# Patient Record
Sex: FEMALE | Race: BLACK OR AFRICAN AMERICAN | NOT HISPANIC OR LATINO | Employment: OTHER | URBAN - METROPOLITAN AREA
[De-identification: names, ages, dates, MRNs, and addresses within clinical notes are randomized per-mention and may not be internally consistent; named-entity substitution may affect disease eponyms.]

---

## 2017-01-20 ENCOUNTER — HOSPITAL ENCOUNTER (EMERGENCY)
Facility: HOSPITAL | Age: 62
Discharge: HOME | End: 2017-01-20
Attending: EMERGENCY MEDICINE
Payer: MEDICARE

## 2017-01-20 VITALS
DIASTOLIC BLOOD PRESSURE: 63 MMHG | BODY MASS INDEX: 25.34 KG/M2 | OXYGEN SATURATION: 98 % | RESPIRATION RATE: 16 BRPM | HEIGHT: 63 IN | SYSTOLIC BLOOD PRESSURE: 136 MMHG | WEIGHT: 143 LBS | TEMPERATURE: 98 F | HEART RATE: 71 BPM
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
## 2017-01-20 DIAGNOSIS — T07.XXXA ABRASIONS OF MULTIPLE SITES: Primary | ICD-10-CM

## 2017-01-20 DIAGNOSIS — S00.83XA FACIAL CONTUSION, INITIAL ENCOUNTER: ICD-10-CM

## 2017-01-20 DIAGNOSIS — S80.01XA CONTUSION OF RIGHT KNEE, INITIAL ENCOUNTER: ICD-10-CM

## 2017-01-20 LAB — GLUCOSE SERPL-MCNC: 131 MG/DL (ref 70–110)

## 2017-01-20 PROCEDURE — 25000003 PHARM REV CODE 250: Performed by: PHYSICIAN ASSISTANT

## 2017-01-20 PROCEDURE — 63600175 PHARM REV CODE 636 W HCPCS: Performed by: PHYSICIAN ASSISTANT

## 2017-01-20 PROCEDURE — 82962 GLUCOSE BLOOD TEST: CPT

## 2017-01-20 PROCEDURE — 99284 EMERGENCY DEPT VISIT MOD MDM: CPT | Mod: 25

## 2017-01-20 PROCEDURE — 96372 THER/PROPH/DIAG INJ SC/IM: CPT

## 2017-01-20 RX ORDER — MUPIROCIN 20 MG/G
OINTMENT TOPICAL DAILY
Status: DISCONTINUED | OUTPATIENT
Start: 2017-01-20 | End: 2017-01-20 | Stop reason: HOSPADM

## 2017-01-20 RX ORDER — KETOROLAC TROMETHAMINE 30 MG/ML
30 INJECTION, SOLUTION INTRAMUSCULAR; INTRAVENOUS
Status: COMPLETED | OUTPATIENT
Start: 2017-01-20 | End: 2017-01-20

## 2017-01-20 RX ORDER — OXYCODONE AND ACETAMINOPHEN 5; 325 MG/1; MG/1
1 TABLET ORAL
Status: COMPLETED | OUTPATIENT
Start: 2017-01-20 | End: 2017-01-20

## 2017-01-20 RX ADMIN — KETOROLAC TROMETHAMINE 30 MG: 30 INJECTION, SOLUTION INTRAMUSCULAR at 02:01

## 2017-01-20 RX ADMIN — MUPIROCIN: 20 OINTMENT TOPICAL at 01:01

## 2017-01-20 RX ADMIN — OXYCODONE AND ACETAMINOPHEN 1 TABLET: 5; 325 TABLET ORAL at 01:01

## 2017-01-20 NOTE — ED PROVIDER NOTES
Encounter Date: 2017       History     Chief Complaint   Patient presents with    [facility] to right nostril, denies LOC, complains of pain to head, right arm, right knee, right hand     Review of patient's allergies indicates:  No Known Allergies  HPI Comments: Lynn Bruce 61 y.o. Female with PMH of HTN, DM, HLD and chronic back pain presented to the ED with c/o pain following a ground level fall. She states that sh had a mistep causing her to fall to the ground with impact to the right side of the body. She denies any LOC however does report some facial impact that caused a right nose bleed that was controlled PTA> She c/o pain to the right upper extremities and right right knee that has gradually worsened since the fall. She presents with few abrasions of the affected areas. She states that the pain is exacerbated certain movements and palpation. She denies any decreased ROM or inability to ambulate however bearing weight causes some pain. She denies any vision changes, dizziness, nausea, vomiting, numbness, tingling, swelling, bruising, bowel or bladder incontinence. She denies trying any medications for the symptoms.       The history is provided by the patient.     Past Medical History   Diagnosis Date    Chronic back pain     Diabetes mellitus     Hyperlipidemia     Hypertension      No past medical history pertinent negatives.  Past Surgical History   Procedure Laterality Date    Fracture heel  3/27/2013     section, classic  3/27/2013     Family History   Problem Relation Age of Onset    Diabetes Mother     Hypertension Mother     Stroke Mother     Heart disease Father     Diabetes Sister     Stroke Sister     Diabetes Brother     Cancer Maternal Grandmother      Social History   Substance Use Topics    Smoking status: Current Some Day Smoker     Last attempt to quit: 10/31/2013    Smokeless tobacco: None    Alcohol use No     Review of  Systems   Constitutional: Positive for activity change. Negative for fever.   HENT: Positive for nosebleeds. Negative for facial swelling and sore throat.    Eyes: Negative for visual disturbance.   Respiratory: Negative for chest tightness and shortness of breath.    Cardiovascular: Negative for chest pain.   Gastrointestinal: Negative for abdominal pain, nausea and vomiting.   Genitourinary: Negative for dysuria.   Musculoskeletal: Negative for back pain and joint swelling.        Right arm, right hand and right knee pain   Skin: Positive for wound. Negative for rash.        abasions   Neurological: Positive for headaches. Negative for dizziness, weakness and light-headedness.   Hematological: Does not bruise/bleed easily.   Psychiatric/Behavioral: Negative for confusion.       Physical Exam   Initial Vitals   BP Pulse Resp Temp SpO2   01/20/17 1230 01/20/17 1230 01/20/17 1230 01/20/17 1230 01/20/17 1230   132/80 71 20 98 °F (36.7 °C) 96 %     Physical Exam    Nursing note and vitals reviewed.  Constitutional: Vital signs are normal. She appears well-developed and well-nourished. She is cooperative.  Non-toxic appearance. She does not appear ill. No distress.   HENT:   Head: Normocephalic and atraumatic.   Eyes: Conjunctivae, EOM and lids are normal. Pupils are equal, round, and reactive to light.   Neck: Normal range of motion. Neck supple. No rigidity.   Cardiovascular: Normal rate and regular rhythm.   Pulmonary/Chest: Breath sounds normal. No respiratory distress. She has no wheezes. She has no rhonchi.   Abdominal: Soft. Normal appearance and bowel sounds are normal. There is no tenderness. There is no rigidity and no guarding.   Musculoskeletal: Normal range of motion.        Right shoulder: She exhibits normal range of motion, no tenderness and no bony tenderness.        Right elbow: She exhibits normal range of motion and no swelling. No tenderness found.        Right wrist: She exhibits normal range of  motion, no tenderness and no bony tenderness.        Right knee: She exhibits normal range of motion, no swelling, no ecchymosis, no LCL laxity and no MCL laxity. Tenderness found.        Right hand: She exhibits normal range of motion, no tenderness and no bony tenderness.        Legs:  Neurological: She is alert and oriented to person, place, and time. She has normal strength. GCS eye subscore is 4. GCS verbal subscore is 5. GCS motor subscore is 6.   Skin: Skin is warm and dry. Abrasion noted. No bruising, no ecchymosis and no rash noted.   Psychiatric: She has a normal mood and affect. Her speech is normal and behavior is normal. Thought content normal.         ED Course   Procedures  Labs Reviewed - No data to display      Imaging Results         CT Maxillofacial Without Contrast (Final result) Result time:  01/20/17 14:14:56    Final result by Tessy Heck MD (01/20/17 14:14:56)    Impression:     No acute fracture.      Electronically signed by: TESSY HECK MD  Date:     01/20/17  Time:    14:14     Narrative:      EXAM: Maxillofacial CT without contrast    CLINICAL INDICATION:  A fall.    TECHNIQUE:  0.625  mm images were acquired through the paranasal sinuses from just above the frontal sinus down to just below the mandible without contrast were acquired.    COMPARISON:None.    FINDINGS: The frontal sinuses, frontal ethmoid recesses, ethmoid sinuses, sphenoid sinuses, and maxillary sinuses are clear.  Ostiomeatal complexes are patent bilaterally.  The nasal bones are intact and the nasal septum is midline.  The orbits and orbital contents have normal appearance.  There is soft tissue swelling overlying the maxilla.    There is no evidence of acute fracture.            CT Head Without Contrast (Final result) Result time:  01/20/17 14:12:17    Final result by Tessy Heck MD (01/20/17 14:12:17)    Impression:     Normal non-contrast cranial CT.      Electronically signed by: TESSY  MARIA R PETE  Date:     01/20/17  Time:    14:12     Narrative:      EXAM: Non-contrast head CT    CLINICAL INDICATION:  Fall.    TECHNIQUE: Routine cranial CT was acquired in the axial plane without contrast and coronal and sagittal reconstructions were performed.    COMPARISON:Prior from 3/5/15    FINDINGS: The brain parenchyma is of normal attenuation with no evidence of acute hemorrhage, mass lesion or major vascular distribution infarct.  There are no extra-axial masses or fluid collections.  The ventricular system is of normal size for age and midline.     The visualized paranasal sinuses and mastoid air cells are clear.     There is no evidence of acute fracture.            X-Ray Knee 3 View Right (Final result) Result time:  01/20/17 14:29:05    Final result by Brynn Hartley MD (01/20/17 14:29:05)    Impression:     No definite acute process seen.      Electronically signed by: BRYNN HARTLEY MD  Date:     01/20/17  Time:    14:29     Narrative:    Left knee radiograph    Results: 3 views.  No definite fracture identified.  No joint effusion.  There is minimal osteophyte formation.                                Attending Attestation:     Physician Attestation Statement for NP/PA:   I have conducted a face to face encounter with this patient in addition to the NP/PA, due to Medical Complexity    Other NP/PA Attestation Additions:      Medical Decision Making: The patient is here after falling onto her face.  She has no lacerations requiring repair.  She has pain to her right knee for x-rays of her near negative CT face and head are negative and the patient will be discharged.                 ED Course     Clinical Impression:   Diagnoses of Fall, Fall, and Fall were pertinent to this visit.  Multiple abrasions, right knee contusion        Ashlyn Peters MD  01/20/17 1800       Ashlyn Peters MD  02/06/17 0011       JUSTICE Henry  02/07/17 1100

## 2017-01-20 NOTE — DISCHARGE INSTRUCTIONS
Bruises (Contusions)    A contusion is a bruise. A bruise happens when a blow to your body doesn't break the skin but does break blood vessels beneath the skin. Blood leaking from the broken vessels causes redness and swelling. As it heals, your bruise is likely to turn colors like purple, green, and yellow. This is normal. The bruise should fade in 2 or 3 weeks.  Factors that make you more likely to bruise  Almost everyone bruises now and then. Certain people do bruise more easily than others. You're more prone to bruising as you get older. That's because blood vessels become more fragile with age. You're also more likely to bruise if you have a clotting disorder such as hemophilia or take medications that reduce clotting, including aspirin.  When to go to the emergency room (ER)  Bruises almost always heal on their own without special treatment. But for some people, a bad bruise can be serious. Seek medical care if you:  · Have a clotting disorder such as hemophilia.  · Have cirrhosis or other serious liver disease.  · Take blood-thinning medications such as warfarin (Coumadin).  What to expect in the ER  A doctor will examine your bruise and ask about any health conditions you have. In some cases, you may have a test to check how well your blood clots. Other treatment will depend on your needs.  Follow-up care  Sometimes a bruise gets worse instead of better. It may become larger and more swollen. This can occur when your body walls off a small pool of blood under the skin (hematoma). In very rare cases, your doctor may need to drain excess blood from the area.  Tip:  Apply an ice pack or bag of frozen peas to a bruise (keep a thin cloth between the cold source and your skin). This can help reduce redness and swelling.   © 8673-6464 [employer]. [address] [place] [address], [place] [address]. All rights reserved. This information is not intended as a substitute for professional medical care. Always  follow your healthcare professional's instructions.          Facial Contusion  A contusion is another word for a bruise. It happens when small blood vessels break open and leak blood into the nearby area. A facial contusion can result from a bump, hit, or fall. This may happen during sports or an accident. Symptoms of a contusion often include changes in skin color (bruising), swelling, and pain.   The swelling from the contusion should decrease in a few days. Bruising and pain may take several weeks to go away.   Home care  · If you have been prescribed medicines for pain, take them as directed.  · To help reduce swelling and pain, wrap a cold pack or bag of frozen peas in a thin towel. Put it on the injured area for up to 20 minutes. Do this a few times a day until the swelling goes down.   · If you have scrapes or cuts on your face requiring stiches or other closures, care for them as directed.  · For the next 24 hours (or longer if instructed):  ¨ Dont drink alcohol, or use sedatives or medicines that make you sleepy.  ¨ Dont drive or operate machinery.  ¨ Avoid doing anything strenuous. Dont lift or strain.  ¨ Do not return to sports or other activity that could result in another head injury.  Note about concussion  Because the injury was to your head, it is possible that a concussion (mild brain injury) could result. You don't have signs of a concussion at this time. But symptoms can show up later. Be alert for signs and symptoms of a concussion. Seek emergency medical care if any of these develop over the next hours to days:  · Headache  · Nausea or vomiting  · Dizziness  · Sensitivity to light or noise  · Unusual sleepiness or grogginess  · Trouble falling asleep  · Personality changes  · Vision changes  · Memory loss  · Confusion  · Trouble walking or clumsiness  · Loss of consciousness (even for a short time)  · Inability to be awakened   Follow-up care  Follow up with your healthcare provider or our  staff as directed.  When to seek medical advice  Call your healthcare provider right away if any of these occur:  · Swelling or pain that gets worse, not better  · New swelling or pain  · Warmth or drainage from the swollen area or from cuts or scrapes  · Fluid drainage or bleeding from the nose or ears  · Fever of 100.4ºF (38ºC) or higher, or as directed by your healthcare provider  © 8832-9377 [employer]. [address] [place] [address], [place] [address]. All rights reserved. This information is not intended as a substitute for professional medical care. Always follow your healthcare professional's instructions.

## 2017-01-20 NOTE — ED TRIAGE NOTES
Pt states walking walking across street to go to store and had started crossing street and R foot got stuck and she fell face first onto gorund.  Swelling with abrasion to R upper lip, hematoma to R forehead, pain with abrasion to R knee, and abrasion to posterior L hand. Denies syncope or loss of consciousness.

## 2017-01-20 NOTE — ED AVS SNAPSHOT
[facility] [address]  [place] [address]               Lynn Bruce   2017 12:57 PM   ED    Description:  Female : 1955   Department:  [facility]           Your Care was Coordinated By:     Provider Role From To    Ashlyn Peters MD Attending Provider 17 [id number] --    JUSTICE Henry Physician Assistant 17 [phone] --      Reason for Visit     Fall           Diagnoses this Visit        Comments    Abrasions of multiple sites    -  Primary     Fall         Facial contusion, initial encounter         Contusion of right knee, initial encounter           ED Disposition     None           To Do List           Follow-up Information     Follow up with Ekaterina Murray NP. Go in 1 week.    Specialty:  Family Medicine    Why:  For wound re-check    Contact information:    [address] [place] [address]  [phone]        [facility] On Call     [facility] -  Assistance  Registered nurses in the [facility] provide clinical advisement, health education, appointment booking, and other advisory services.  Call for this free service at [phone].             Medications           Message regarding Medications     Verify the changes and/or additions to your medication regime listed below are the same as discussed with your clinician today.  If any of these changes or additions are incorrect, please notify your healthcare provider.        These medications were administered today        Dose Freq    mupirocin 2 % ointment  Daily    Sig: Apply topically once daily.    Class: Normal    Route: Topical (Top)    Cosign for Ordering: Required by Ashlyn Peters MD    oxycodone-acetaminophen 5-325 mg per tablet 1 tablet 1 tablet ED 1 Time    Sig: Take 1 tablet by mouth ED 1 Time.    Class: Normal    Route: Oral    Cosign for Ordering: Required by Ashlyn Peters MD           Verify that the below list of  "medications is an accurate representation of the medications you are currently taking.  If none reported, the list may be blank. If incorrect, please contact your healthcare provider. Carry this list with you in case of emergency.           Current Medications     diazePAM (VALIUM) 10 MG Tab Take 10 mg by mouth 2 (two) times daily.    FREESTYLE LITE STRIPS Strp     lactulose (CEPHULAC) 20 gram Pack Take 1 packet (20 g total) by mouth once daily.    lisinopril (PRINIVIL,ZESTRIL) 2.5 MG tablet Take 2.5 mg by mouth once daily.    metformin (GLUCOPHAGE) 500 MG tablet Take 1,000 mg by mouth daily with breakfast.    mupirocin 2 % ointment Apply topically once daily.    oxycodone-acetaminophen (PERCOCET)  mg per tablet Take 1 tablet by mouth every 4 (four) hours as needed for Pain.    simethicone (GAS-X) 80 MG chewable tablet Take 1 tablet (80 mg total) by mouth every 6 (six) hours as needed for Flatulence.    simvastatin (ZOCOR) 40 MG tablet Take 80 mg by mouth every evening.            Clinical Reference Information           Your Vitals Were     BP Pulse Temp Resp Height Weight    132/80 71 98 °F (36.7 °C) 20 5' 3" (1.6 m) 64.9 kg (143 lb)    SpO2 BMI             96% 25.33 kg/m2         Allergies as of 1/20/2017     No Known Allergies      Immunizations Administered on Date of Encounter - 1/20/2017     None      ED Micro, Lab, POCT     Start Ordered       Status Ordering Provider    01/20/17 0000 01/20/17 1235  POCT glucose     Comments:  This order was created through External Result Entry    Completed       ED Imaging Orders     Start Ordered       Status Ordering Provider    01/20/17 1326 01/20/17 1328  CT Head Without Contrast  1 time imaging      Final result     01/20/17 1326 01/20/17 1328  X-Ray Knee 3 View Right  1 time imaging      Final result     01/20/17 1325 01/20/17 1328  CT Maxillofacial Without Contrast  1 time imaging      Final result         Discharge Instructions         Bruises " (Contusions)    A contusion is a bruise. A bruise happens when a blow to your body doesn't break the skin but does break blood vessels beneath the skin. Blood leaking from the broken vessels causes redness and swelling. As it heals, your bruise is likely to turn colors like purple, green, and yellow. This is normal. The bruise should fade in 2 or 3 weeks.  Factors that make you more likely to bruise  Almost everyone bruises now and then. Certain people do bruise more easily than others. You're more prone to bruising as you get older. That's because blood vessels become more fragile with age. You're also more likely to bruise if you have a clotting disorder such as hemophilia or take medications that reduce clotting, including aspirin.  When to go to the emergency room (ER)  Bruises almost always heal on their own without special treatment. But for some people, a bad bruise can be serious. Seek medical care if you:  · Have a clotting disorder such as hemophilia.  · Have cirrhosis or other serious liver disease.  · Take blood-thinning medications such as warfarin (Coumadin).  What to expect in the ER  A doctor will examine your bruise and ask about any health conditions you have. In some cases, you may have a test to check how well your blood clots. Other treatment will depend on your needs.  Follow-up care  Sometimes a bruise gets worse instead of better. It may become larger and more swollen. This can occur when your body walls off a small pool of blood under the skin (hematoma). In very rare cases, your doctor may need to drain excess blood from the area.  Tip:  Apply an ice pack or bag of frozen peas to a bruise (keep a thin cloth between the cold source and your skin). This can help reduce redness and swelling.   © 2619-6714 [employer]. [address] [place] [address], [place] [address]. All rights reserved. This information is not intended as a substitute for professional medical care. Always follow your  healthcare professional's instructions.          Facial Contusion  A contusion is another word for a bruise. It happens when small blood vessels break open and leak blood into the nearby area. A facial contusion can result from a bump, hit, or fall. This may happen during sports or an accident. Symptoms of a contusion often include changes in skin color (bruising), swelling, and pain.   The swelling from the contusion should decrease in a few days. Bruising and pain may take several weeks to go away.   Home care  · If you have been prescribed medicines for pain, take them as directed.  · To help reduce swelling and pain, wrap a cold pack or bag of frozen peas in a thin towel. Put it on the injured area for up to 20 minutes. Do this a few times a day until the swelling goes down.   · If you have scrapes or cuts on your face requiring stiches or other closures, care for them as directed.  · For the next 24 hours (or longer if instructed):  ¨ Dont drink alcohol, or use sedatives or medicines that make you sleepy.  ¨ Dont drive or operate machinery.  ¨ Avoid doing anything strenuous. Dont lift or strain.  ¨ Do not return to sports or other activity that could result in another head injury.  Note about concussion  Because the injury was to your head, it is possible that a concussion (mild brain injury) could result. You don't have signs of a concussion at this time. But symptoms can show up later. Be alert for signs and symptoms of a concussion. Seek emergency medical care if any of these develop over the next hours to days:  · Headache  · Nausea or vomiting  · Dizziness  · Sensitivity to light or noise  · Unusual sleepiness or grogginess  · Trouble falling asleep  · Personality changes  · Vision changes  · Memory loss  · Confusion  · Trouble walking or clumsiness  · Loss of consciousness (even for a short time)  · Inability to be awakened   Follow-up care  Follow up with your healthcare provider or our staff as  directed.  When to seek medical advice  Call your healthcare provider right away if any of these occur:  · Swelling or pain that gets worse, not better  · New swelling or pain  · Warmth or drainage from the swollen area or from cuts or scrapes  · Fluid drainage or bleeding from the nose or ears  · Fever of 100.4ºF (38ºC) or higher, or as directed by your healthcare provider  © 9211-2683 [employer]. [address] [place] [address], [place] [address]. All rights reserved. This information is not intended as a substitute for professional medical care. Always follow your healthcare professional's instructions.          [facility] Sign-Up     Activating your [facility] account is as easy as 1-2-3!     1) Visit [email or web address], select Sign Up Now, enter this activation code and your date of birth, then select Next.  [email or web address]  Expires: 3/6/2017  2:42 PM      2) Create a username and password to use when you visit [facility] in the future and select a security question in case you lose your password and select Next.    3) Enter your e-mail address and click Sign Up!    Additional Information  If you have questions, please e-mail [email or web address] or call [phone] to talk to our [facility] staff. Remember, [facility] is NOT to be used for urgent needs. For medical emergencies, dial 911.         Smoking Cessation     If you would like to quit smoking:   You may be eligible for free services if you are a [place] resident and started smoking cigarettes before September 1, 1988.  Call the Smoking Cessation Trust (SCT) toll free at (268) 297-2625 or (655) 730-3948.   Call 0-800-QUIT-NOW if you do not meet the above criteria.             [facility] complies with applicable Federal civil rights laws and does not discriminate on the basis of race, color, national origin, age, disability, or sex.        Language Assistance Services     ATTENTION: Language assistance services are available, free  of charge. Please call [phone].      ATENCIÓN: Si habla español, tiene a sandoval disposición servicios gratuitos de asistencia lingüística. Llame al [phone].     CHÚ Ý: N?u b?n nói Ti?ng Vi?t, có các d?ch v? h? tr? ngôn ng? mi?n phí dành cho b?n. G?i s? [phone].

## 2017-01-21 LAB — POCT GLUCOSE: 131 MG/DL (ref 70–110)

## 2017-09-01 DIAGNOSIS — M48.061 STENOSIS, SPINAL, LUMBAR: Primary | ICD-10-CM

## 2017-09-15 ENCOUNTER — HOSPITAL ENCOUNTER (OUTPATIENT)
Dept: RADIOLOGY | Facility: HOSPITAL | Age: 62
Discharge: HOME | End: 2017-09-15
Attending: PHYSICAL MEDICINE & REHABILITATION
Payer: MEDICARE

## 2017-09-15 DIAGNOSIS — M48.061 STENOSIS, SPINAL, LUMBAR: ICD-10-CM

## 2017-09-15 PROCEDURE — 72148 MRI LUMBAR SPINE W/O DYE: CPT | Mod: TC

## 2017-09-15 PROCEDURE — 72148 MRI LUMBAR SPINE W/O DYE: CPT | Mod: 26,,, | Performed by: RADIOLOGY

## 2018-05-09 ENCOUNTER — HOSPITAL ENCOUNTER (OUTPATIENT)
Facility: HOSPITAL | Age: 63
Discharge: HOME | End: 2018-05-09
Attending: EMERGENCY MEDICINE | Admitting: HOSPITALIST
Payer: MEDICARE

## 2018-05-09 VITALS
WEIGHT: 153.25 LBS | DIASTOLIC BLOOD PRESSURE: 65 MMHG | HEART RATE: 66 BPM | HEIGHT: 63 IN | SYSTOLIC BLOOD PRESSURE: 117 MMHG | BODY MASS INDEX: 27.15 KG/M2 | TEMPERATURE: 98 F | RESPIRATION RATE: 18 BRPM | OXYGEN SATURATION: 97 %

## 2018-05-09 DIAGNOSIS — R07.9 CHEST PAIN: Primary | ICD-10-CM

## 2018-05-09 DIAGNOSIS — K20.90 ESOPHAGITIS: ICD-10-CM

## 2018-05-09 PROBLEM — F17.200 CURRENT SMOKER: Status: ACTIVE | Noted: 2018-05-09

## 2018-05-09 PROBLEM — F41.9 ANXIETY: Status: ACTIVE | Noted: 2018-05-09

## 2018-05-09 PROBLEM — E78.2 MIXED HYPERLIPIDEMIA: Status: ACTIVE | Noted: 2018-05-09

## 2018-05-09 PROBLEM — E78.5 HYPERLIPIDEMIA: Status: ACTIVE | Noted: 2018-05-09

## 2018-05-09 LAB
ALBUMIN SERPL BCP-MCNC: 3.7 G/DL
ALP SERPL-CCNC: 81 U/L
ALT SERPL W/O P-5'-P-CCNC: 5 U/L
ANION GAP SERPL CALC-SCNC: 10 MMOL/L
AST SERPL-CCNC: 11 U/L
BASOPHILS # BLD AUTO: 0.02 K/UL
BASOPHILS NFR BLD: 0.3 %
BILIRUB SERPL-MCNC: 0.3 MG/DL
BNP SERPL-MCNC: 14 PG/ML
BUN SERPL-MCNC: 9 MG/DL
CALCIUM SERPL-MCNC: 9.5 MG/DL
CHLORIDE SERPL-SCNC: 104 MMOL/L
CHOLEST SERPL-MCNC: 156 MG/DL
CHOLEST/HDLC SERPL: 2.7 {RATIO}
CO2 SERPL-SCNC: 25 MMOL/L
CREAT SERPL-MCNC: 0.8 MG/DL
DIFFERENTIAL METHOD: ABNORMAL
EOSINOPHIL # BLD AUTO: 0.1 K/UL
EOSINOPHIL NFR BLD: 1.9 %
ERYTHROCYTE [DISTWIDTH] IN BLOOD BY AUTOMATED COUNT: 14 %
EST. GFR  (AFRICAN AMERICAN): >60 ML/MIN/1.73 M^2
EST. GFR  (NON AFRICAN AMERICAN): >60 ML/MIN/1.73 M^2
ESTIMATED AVG GLUCOSE: 131 MG/DL
GLUCOSE SERPL-MCNC: 128 MG/DL
HBA1C MFR BLD HPLC: 6.2 %
HCT VFR BLD AUTO: 34 %
HDLC SERPL-MCNC: 57 MG/DL
HDLC SERPL: 36.5 %
HGB BLD-MCNC: 11.2 G/DL
INR PPP: 1
LDLC SERPL CALC-MCNC: 81.8 MG/DL
LYMPHOCYTES # BLD AUTO: 2.6 K/UL
LYMPHOCYTES NFR BLD: 45.6 %
MAGNESIUM SERPL-MCNC: 1.7 MG/DL
MCH RBC QN AUTO: 28.8 PG
MCHC RBC AUTO-ENTMCNC: 32.9 G/DL
MCV RBC AUTO: 87 FL
MONOCYTES # BLD AUTO: 0.4 K/UL
MONOCYTES NFR BLD: 7.5 %
NEUTROPHILS # BLD AUTO: 2.6 K/UL
NEUTROPHILS NFR BLD: 44.7 %
NONHDLC SERPL-MCNC: 99 MG/DL
PLATELET # BLD AUTO: 236 K/UL
PMV BLD AUTO: 8.6 FL
POCT GLUCOSE: 104 MG/DL (ref 70–110)
POCT GLUCOSE: 94 MG/DL (ref 70–110)
POTASSIUM SERPL-SCNC: 3.9 MMOL/L
PROT SERPL-MCNC: 7.2 G/DL
PROTHROMBIN TIME: 10.4 SEC
RBC # BLD AUTO: 3.89 M/UL
SODIUM SERPL-SCNC: 139 MMOL/L
TRIGL SERPL-MCNC: 86 MG/DL
TROPONIN I SERPL DL<=0.01 NG/ML-MCNC: <0.006 NG/ML
TROPONIN I SERPL DL<=0.01 NG/ML-MCNC: <0.006 NG/ML
TSH SERPL DL<=0.005 MIU/L-ACNC: 1.85 UIU/ML
WBC # BLD AUTO: 5.72 K/UL

## 2018-05-09 PROCEDURE — 25000003 PHARM REV CODE 250: Performed by: NURSE PRACTITIONER

## 2018-05-09 PROCEDURE — G0378 HOSPITAL OBSERVATION PER HR: HCPCS

## 2018-05-09 PROCEDURE — 84484 ASSAY OF TROPONIN QUANT: CPT

## 2018-05-09 PROCEDURE — 85025 COMPLETE CBC W/AUTO DIFF WBC: CPT

## 2018-05-09 PROCEDURE — 99284 EMERGENCY DEPT VISIT MOD MDM: CPT | Mod: 25

## 2018-05-09 PROCEDURE — 93005 ELECTROCARDIOGRAM TRACING: CPT

## 2018-05-09 PROCEDURE — 93010 ELECTROCARDIOGRAM REPORT: CPT | Mod: ,,, | Performed by: INTERNAL MEDICINE

## 2018-05-09 PROCEDURE — 36415 COLL VENOUS BLD VENIPUNCTURE: CPT

## 2018-05-09 PROCEDURE — 83735 ASSAY OF MAGNESIUM: CPT

## 2018-05-09 PROCEDURE — 25000003 PHARM REV CODE 250: Performed by: EMERGENCY MEDICINE

## 2018-05-09 PROCEDURE — 83036 HEMOGLOBIN GLYCOSYLATED A1C: CPT

## 2018-05-09 PROCEDURE — 83880 ASSAY OF NATRIURETIC PEPTIDE: CPT

## 2018-05-09 PROCEDURE — 85610 PROTHROMBIN TIME: CPT

## 2018-05-09 PROCEDURE — 80053 COMPREHEN METABOLIC PANEL: CPT

## 2018-05-09 PROCEDURE — 80061 LIPID PANEL: CPT

## 2018-05-09 PROCEDURE — S4991 NICOTINE PATCH NONLEGEND: HCPCS | Performed by: NURSE PRACTITIONER

## 2018-05-09 PROCEDURE — 84443 ASSAY THYROID STIM HORMONE: CPT

## 2018-05-09 PROCEDURE — 84484 ASSAY OF TROPONIN QUANT: CPT | Mod: 91

## 2018-05-09 RX ORDER — IBUPROFEN 200 MG
1 TABLET ORAL DAILY
Status: DISCONTINUED | OUTPATIENT
Start: 2018-05-09 | End: 2018-05-09 | Stop reason: HOSPADM

## 2018-05-09 RX ORDER — DIAZEPAM 5 MG/1
10 TABLET ORAL 2 TIMES DAILY PRN
Status: DISCONTINUED | OUTPATIENT
Start: 2018-05-09 | End: 2018-05-09 | Stop reason: HOSPADM

## 2018-05-09 RX ORDER — SODIUM CHLORIDE 0.9 % (FLUSH) 0.9 %
3 SYRINGE (ML) INJECTION EVERY 8 HOURS
Status: DISCONTINUED | OUTPATIENT
Start: 2018-05-09 | End: 2018-05-09 | Stop reason: HOSPADM

## 2018-05-09 RX ORDER — OMEPRAZOLE 20 MG/1
20 CAPSULE, DELAYED RELEASE ORAL DAILY
Qty: 30 CAPSULE | Refills: 0 | Status: SHIPPED | OUTPATIENT
Start: 2018-05-09 | End: 2020-04-04 | Stop reason: SDUPTHER

## 2018-05-09 RX ORDER — GLUCAGON 1 MG
1 KIT INJECTION
Status: DISCONTINUED | OUTPATIENT
Start: 2018-05-09 | End: 2018-05-09 | Stop reason: HOSPADM

## 2018-05-09 RX ORDER — FAMOTIDINE 20 MG/1
20 TABLET, FILM COATED ORAL 2 TIMES DAILY
Status: DISCONTINUED | OUTPATIENT
Start: 2018-05-09 | End: 2018-05-09 | Stop reason: HOSPADM

## 2018-05-09 RX ORDER — INSULIN ASPART 100 [IU]/ML
0-5 INJECTION, SOLUTION INTRAVENOUS; SUBCUTANEOUS
Status: DISCONTINUED | OUTPATIENT
Start: 2018-05-09 | End: 2018-05-09 | Stop reason: HOSPADM

## 2018-05-09 RX ORDER — ACETAMINOPHEN 325 MG/1
650 TABLET ORAL EVERY 4 HOURS PRN
Status: DISCONTINUED | OUTPATIENT
Start: 2018-05-09 | End: 2018-05-09 | Stop reason: HOSPADM

## 2018-05-09 RX ORDER — LISINOPRIL 5 MG/1
5 TABLET ORAL DAILY
Status: DISCONTINUED | OUTPATIENT
Start: 2018-05-09 | End: 2018-05-09 | Stop reason: HOSPADM

## 2018-05-09 RX ORDER — ACETAMINOPHEN 325 MG/1
650 TABLET ORAL EVERY 4 HOURS PRN
Status: DISCONTINUED | OUTPATIENT
Start: 2018-05-09 | End: 2018-05-09

## 2018-05-09 RX ORDER — DIAZEPAM 5 MG/1
5 TABLET ORAL ONCE
Status: COMPLETED | OUTPATIENT
Start: 2018-05-09 | End: 2018-05-09

## 2018-05-09 RX ORDER — ACETAMINOPHEN 500 MG
1000 TABLET ORAL
Status: COMPLETED | OUTPATIENT
Start: 2018-05-09 | End: 2018-05-09

## 2018-05-09 RX ORDER — OXYCODONE AND ACETAMINOPHEN 10; 325 MG/1; MG/1
1 TABLET ORAL EVERY 8 HOURS PRN
COMMUNITY

## 2018-05-09 RX ORDER — ONDANSETRON 8 MG/1
8 TABLET, ORALLY DISINTEGRATING ORAL EVERY 8 HOURS PRN
Status: DISCONTINUED | OUTPATIENT
Start: 2018-05-09 | End: 2018-05-09 | Stop reason: HOSPADM

## 2018-05-09 RX ORDER — NAPROXEN SODIUM 220 MG/1
81 TABLET, FILM COATED ORAL DAILY
Status: DISCONTINUED | OUTPATIENT
Start: 2018-05-09 | End: 2018-05-09 | Stop reason: HOSPADM

## 2018-05-09 RX ORDER — IBUPROFEN 200 MG
24 TABLET ORAL
Status: DISCONTINUED | OUTPATIENT
Start: 2018-05-09 | End: 2018-05-09 | Stop reason: HOSPADM

## 2018-05-09 RX ORDER — IBUPROFEN 200 MG
16 TABLET ORAL
Status: DISCONTINUED | OUTPATIENT
Start: 2018-05-09 | End: 2018-05-09 | Stop reason: HOSPADM

## 2018-05-09 RX ORDER — NITROGLYCERIN 0.4 MG/1
0.4 TABLET SUBLINGUAL EVERY 5 MIN PRN
Status: DISCONTINUED | OUTPATIENT
Start: 2018-05-09 | End: 2018-05-09 | Stop reason: HOSPADM

## 2018-05-09 RX ORDER — SIMVASTATIN 20 MG/1
80 TABLET, FILM COATED ORAL NIGHTLY
Status: DISCONTINUED | OUTPATIENT
Start: 2018-05-09 | End: 2018-05-09 | Stop reason: HOSPADM

## 2018-05-09 RX ORDER — OXYCODONE AND ACETAMINOPHEN 7.5; 325 MG/1; MG/1
1 TABLET ORAL EVERY 8 HOURS PRN
Status: DISCONTINUED | OUTPATIENT
Start: 2018-05-09 | End: 2018-05-09

## 2018-05-09 RX ORDER — ASPIRIN 81 MG/1
81 TABLET ORAL DAILY
COMMUNITY

## 2018-05-09 RX ADMIN — ACETAMINOPHEN 1000 MG: 500 TABLET ORAL at 04:05

## 2018-05-09 RX ADMIN — LIDOCAINE HYDROCHLORIDE: 20 SOLUTION ORAL; TOPICAL at 06:05

## 2018-05-09 RX ADMIN — OXYCODONE HYDROCHLORIDE AND ACETAMINOPHEN 1 TABLET: 7.5; 325 TABLET ORAL at 08:05

## 2018-05-09 RX ADMIN — NICOTINE 1 PATCH: 21 PATCH, EXTENDED RELEASE TRANSDERMAL at 08:05

## 2018-05-09 RX ADMIN — DIAZEPAM 5 MG: 5 TABLET ORAL at 06:05

## 2018-05-09 RX ADMIN — ASPIRIN 81 MG 81 MG: 81 TABLET ORAL at 08:05

## 2018-05-09 RX ADMIN — LISINOPRIL 5 MG: 5 TABLET ORAL at 08:05

## 2018-05-09 RX ADMIN — FAMOTIDINE 20 MG: 20 TABLET ORAL at 08:05

## 2018-05-09 NOTE — ASSESSMENT & PLAN NOTE
Glucose on admit 128. Holding hoem Metformin 1000 mg daily.  Check blood glucose AC and HS and use SSI. Diabetic diet, when cleared for Diet. Check A1c.

## 2018-05-09 NOTE — PLAN OF CARE
Removed tele, no ectopy on tele. Removed IV, tip intact. Went over discharge instructions, verbalized understanding.

## 2018-05-09 NOTE — PLAN OF CARE
TN went to meet with patient, daughter at bedside.  Patient is discharged to home, no needs noted upon discharge. Follow-Up appointment scheduled.     --Follow-Up information reviewed with patient and daughter.    Patient's prescriptions called into the [facility].     Follow-up With  Details  Why  Contact Info   Ekaterina Murray NP  On 5/30/2018  at 11:00 am, Primary Care Physician Fax# [phone]  [address] [address]  [phone]      05/09/18 1147   Final Note   Assessment Type Final Discharge Note   Discharge Disposition Home   What phone number can be called within the next 1-3 days to see how you are doing after discharge? [phone]   Hospital Follow Up  Appt(s) scheduled? Yes   Discharge plans and expectations educations in teach back method with documentation complete? Yes   Right Care Referral Info   Post Acute Recommendation No Care     Fe Walters RN  Transition Navigator  [phone]

## 2018-05-09 NOTE — PLAN OF CARE
Problem: Patient Care Overview  Goal: Plan of Care Review  Outcome: Outcome(s) achieved Date Met: 05/09/18 05/09/18 1136   Coping/Psychosocial   Plan Of Care Reviewed With patient   Patient awake, alert and oriented. Complains Of a HA, gave a percocet and resolved. Patient's blood glucose monitored ac/hs and covered per sliding scale. Patients bed alarm remains on, call light in reach, non skid socks when out of bed.

## 2018-05-09 NOTE — HPI
Lynn Bruce is a 63 year old female with a PMHx of Type 2 Diabetes, Hypertension, Hyperlipidemia, Anxiety, GERD, and Chronic Back Pain.  She is a current smoker. Her PCP is Ekaterina Murray NP with [facility]. She presented to [facility] ED with with tight, mid sternal chest pain radiating to the neck and left chest onset just prior to arrival, waking her from sleep. She has associated with palpitations, fatigue, headache. She denies sweating, nausea, dizziness, light headedness, extremity numbness or weakness, abdominal pain. Her pain does not radiate to the back.  She took aspirin tonight with no improvement to her pain.  Her chest pain was mostly resolved in the ED after Aspirin 325 mg,  2 sublingual NTG, 1 inch NTG paste en route. She has occasional chest pain such as this, which usually resolves with an aspirin.  She states that she had a negative out-patient stress test and echocardiogram about 6 months ago at [facility]. Her Father  of a heart attack at the ago of 50.  Chest X-Ray showed no acute process, with some evidence of COPD.  Her first Troponin is negative. Her ECG is Normal Sinus Rhythm with no T wave changes. She is admitted observation status to [facility]. Upon my exam, she still endorses some mild epigastric discomfort and states she did not take her valium last night. Will give GI Cocktail and her home dose of Valium.

## 2018-05-09 NOTE — SUBJECTIVE & OBJECTIVE
Past Medical History:   Diagnosis Date    Chronic back pain     Diabetes mellitus     Hyperlipidemia     Hypertension        Past Surgical History:   Procedure Laterality Date     SECTION, CLASSIC  3/27/2013    fracture heel  3/27/2013       Review of patient's allergies indicates:  No Known Allergies    No current facility-administered medications on file prior to encounter.      Current Outpatient Prescriptions on File Prior to Encounter   Medication Sig    diazePAM (VALIUM) 10 MG Tab Take 10 mg by mouth 2 (two) times daily.    FREESTYLE LITE STRIPS Strp     lisinopril (PRINIVIL,ZESTRIL) 2.5 MG tablet Take 5 mg by mouth once daily.     metformin (GLUCOPHAGE) 500 MG tablet Take 1,000 mg by mouth daily with breakfast.    simvastatin (ZOCOR) 40 MG tablet Take 80 mg by mouth every evening.     [DISCONTINUED] lactulose (CEPHULAC) 20 gram Pack Take 1 packet (20 g total) by mouth once daily.    [DISCONTINUED] oxycodone-acetaminophen (PERCOCET)  mg per tablet Take 1 tablet by mouth every 4 (four) hours as needed for Pain.    [DISCONTINUED] simethicone (GAS-X) 80 MG chewable tablet Take 1 tablet (80 mg total) by mouth every 6 (six) hours as needed for Flatulence.     Family History     Problem Relation (Age of Onset)    Cancer Maternal Grandmother    Diabetes Mother, Sister, Brother    Heart disease Father    Hypertension Mother    Stroke Mother, Sister        Social History Main Topics    Smoking status: Current Some Day Smoker     Last attempt to quit: 10/31/2013    Smokeless tobacco: Not on file    Alcohol use No    Drug use: No    Sexual activity: Yes     Partners: Male     Review of Systems   Constitutional: Negative for chills, diaphoresis and fever.   HENT: Negative for sore throat and trouble swallowing.    Eyes: Negative for photophobia and visual disturbance.   Respiratory: Positive for chest tightness. Negative for cough, shortness of breath and wheezing.    Cardiovascular:  Positive for chest pain and palpitations.   Gastrointestinal: Negative for abdominal pain, constipation, diarrhea, nausea and vomiting.   Endocrine: Negative for polydipsia and polyphagia.   Genitourinary: Negative for decreased urine volume, dysuria, hematuria and urgency.   Musculoskeletal: Positive for back pain. Negative for joint swelling, neck pain and neck stiffness.   Neurological: Negative for weakness, numbness and headaches.   Psychiatric/Behavioral: Negative for agitation, dysphoric mood and suicidal ideas. The patient is nervous/anxious.      Objective:     Vital Signs (Most Recent):  Temp: 98.1 °F (36.7 °C) (05/09/18 0633)  Pulse: 70 (05/09/18 0633)  Resp: 16 (05/09/18 0633)  BP: 129/64 (05/09/18 0633)  SpO2: 97 % (05/09/18 0633) Vital Signs (24h Range):  Temp:  [98.1 °F (36.7 °C)-98.9 °F (37.2 °C)] 98.1 °F (36.7 °C)  Pulse:  [68-78] 70  Resp:  [15-18] 16  SpO2:  [96 %-98 %] 97 %  BP: (126-129)/(64-76) 129/64     Weight: 64.9 kg (143 lb)  Body mass index is 25.33 kg/m².    Physical Exam   Constitutional: She is oriented to person, place, and time. She appears well-developed and well-nourished. No distress.   HENT:   Head: Normocephalic and atraumatic.   Mouth/Throat: Oropharynx is clear and moist. No oropharyngeal exudate.   Eyes: Conjunctivae are normal. Pupils are equal, round, and reactive to light. No scleral icterus.   Neck: Neck supple.   Cardiovascular: Normal rate, regular rhythm, normal heart sounds and intact distal pulses.  Exam reveals no gallop and no friction rub.    No murmur heard.  Pulmonary/Chest: Effort normal and breath sounds normal. No respiratory distress. She has no wheezes. She has no rales.   Abdominal: Soft. Bowel sounds are normal. She exhibits no distension. There is no tenderness. There is no rebound and no guarding.   Musculoskeletal: She exhibits no edema, tenderness or deformity.   Neurological: She is alert and oriented to person, place, and time. She exhibits normal  muscle tone.   Skin: Skin is warm and dry. Capillary refill takes less than 2 seconds. No rash noted. She is not diaphoretic.   Psychiatric: Her behavior is normal. Her mood appears anxious.   Nursing note and vitals reviewed.        CRANIAL NERVES     CN III, IV, VI   Pupils are equal, round, and reactive to light.       Significant Labs:   CBC:   Recent Labs  Lab 05/09/18 0433   WBC 5.72   HGB 11.2*   HCT 34.0*        CMP:   Recent Labs  Lab 05/09/18 0433      K 3.9      CO2 25   *   BUN 9   CREATININE 0.8   CALCIUM 9.5   PROT 7.2   ALBUMIN 3.7   BILITOT 0.3   ALKPHOS 81   AST 11   ALT 5*   ANIONGAP 10   EGFRNONAA >60     Cardiac Markers:   Recent Labs  Lab 05/09/18  0532   BNP 14     Magnesium:   Recent Labs  Lab 05/09/18 0433   MG 1.7     Troponin:   Recent Labs  Lab 05/09/18 0433   TROPONINI <0.006     TSH: No results for input(s): TSH in the last 4320 hours.    ECG 5/9/18 428 am: Normal sinus rhythm (HR 71). Normal ECG, No STEMI  Significant Imaging: I have reviewed all pertinent imaging results/findings within the past 24 hours.   Imaging Results          X-Ray Chest PA And Lateral (Final result)  Result time 05/09/18 05:12:23    Final result by Isaiah Alegre MD (05/09/18 05:12:23)                 Impression:      1. No acute radiographic findings in the chest.  2. Slightly overexpanded lungs which can be seen with COPD.      Electronically signed by: Isaiah Alegre MD  Date:    05/09/2018  Time:    05:12             Narrative:    EXAMINATION:  XR CHEST PA AND LATERAL    CLINICAL HISTORY:  Chest Pain;    TECHNIQUE:  PA and lateral views of the chest were performed.    COMPARISON:  Radiograph 02/02/2015.    FINDINGS:  Mediastinal structures are midline.  Cardiac silhouette is normal in size.  Lungs are slightly over expanded but symmetric.  No focal airspace opacities.  No pneumothorax or pleural effusions.  No free air beneath the diaphragm.  No acute osseous  abnormalities.

## 2018-05-09 NOTE — ED TRIAGE NOTES
"Pt presents to the ED via EMS from home, with c/o substernal chest pain. Patient states pain woke her up out of her sleep. Describes as non "radiating pressure". Pt has smoking history and family history of cardiac dx but pt does not currently take any heart meds. After Aspirin 324 mg, Nitro 0.4 mg. SL x 2 + Nitropaste put into place, pt rates pain at 1 currently. Dr. Winchester at bedside. Vital signs are stable. No acute distress noted.   "

## 2018-05-09 NOTE — H&P
[facility]  History & Physical    Patient Name: Lynn Bruce  MRN: [id number]  Admission Date: 2018  Attending Physician: Heber Baker MD   Primary Care Provider: Ekaterina Murray NP         Patient information was obtained from patient, past medical records and ER records.     Subjective:     Principal Problem:Chest pain    Chief Complaint:   Chief Complaint   Patient presents with    Chest Pain     Patient presents to the ED with reports of having chest pain that woke her from sleeping. Called ems. EMS reports giving 324 mg of ASA, 2 sublingual ntg, 1 inch of nitro paste. Pain started at a 4 and is down to 1/10.         HPI: Lynn Bruce is a 63 year old female with a PMHx of Type 2 Diabetes, Hypertension, Hyperlipidemia, Anxiety, GERD, and Chronic Back Pain.  She is a current smoker. Her PCP is Ekaterina Murray NP with [facility]. She presented to [facility] ED with with tight, mid sternal chest pain radiating to the neck and left chest onset just prior to arrival, waking her from sleep. She has associated with palpitations, fatigue, headache. She denies sweating, nausea, dizziness, light headedness, extremity numbness or weakness, abdominal pain. Her pain does not radiate to the back.  She took aspirin tonight with no improvement to her pain.  Her chest pain was mostly resolved in the ED after Aspirin 325 mg,  2 sublingual NTG, 1 inch NTG paste en route. She has occasional chest pain such as this, which usually resolves with an aspirin.  She states that she had a negative out-patient stress test and echocardiogram about 6 months ago at [facility]. Her Father  of a heart attack at the ago of 50.  Chest X-Ray showed no acute process, with some evidence of COPD.  Her first Troponin is negative. Her ECG is Normal Sinus Rhythm with no T wave changes. She is admitted observation status to [facility]. Upon my exam, she still endorses some mild  epigastric discomfort and states she did not take her valium last night. Will give GI Cocktail and her home dose of Valium.     Past Medical History:   Diagnosis Date    Chronic back pain     Diabetes mellitus     Hyperlipidemia     Hypertension        Past Surgical History:   Procedure Laterality Date     SECTION, CLASSIC  3/27/2013    fracture heel  3/27/2013       Review of patient's allergies indicates:  No Known Allergies    No current facility-administered medications on file prior to encounter.      Current Outpatient Prescriptions on File Prior to Encounter   Medication Sig    diazePAM (VALIUM) 10 MG Tab Take 10 mg by mouth 2 (two) times daily.    FREESTYLE LITE STRIPS Strp     lisinopril (PRINIVIL,ZESTRIL) 2.5 MG tablet Take 5 mg by mouth once daily.     metformin (GLUCOPHAGE) 500 MG tablet Take 1,000 mg by mouth daily with breakfast.    simvastatin (ZOCOR) 40 MG tablet Take 80 mg by mouth every evening.     [DISCONTINUED] lactulose (CEPHULAC) 20 gram Pack Take 1 packet (20 g total) by mouth once daily.    [DISCONTINUED] oxycodone-acetaminophen (PERCOCET)  mg per tablet Take 1 tablet by mouth every 4 (four) hours as needed for Pain.    [DISCONTINUED] simethicone (GAS-X) 80 MG chewable tablet Take 1 tablet (80 mg total) by mouth every 6 (six) hours as needed for Flatulence.     Family History     Problem Relation (Age of Onset)    Cancer Maternal Grandmother    Diabetes Mother, Sister, Brother    Heart disease Father    Hypertension Mother    Stroke Mother, Sister        Social History Main Topics    Smoking status: Current Some Day Smoker     Last attempt to quit: 10/31/2013    Smokeless tobacco: Not on file    Alcohol use No    Drug use: No    Sexual activity: Yes     Partners: Male     Review of Systems   Constitutional: Negative for chills, diaphoresis and fever.   HENT: Negative for sore throat and trouble swallowing.    Eyes: Negative for photophobia and visual  disturbance.   Respiratory: Positive for chest tightness. Negative for cough, shortness of breath and wheezing.    Cardiovascular: Positive for chest pain and palpitations.   Gastrointestinal: Negative for abdominal pain, constipation, diarrhea, nausea and vomiting.   Endocrine: Negative for polydipsia and polyphagia.   Genitourinary: Negative for decreased urine volume, dysuria, hematuria and urgency.   Musculoskeletal: Positive for back pain. Negative for joint swelling, neck pain and neck stiffness.   Neurological: Negative for weakness, numbness and headaches.   Psychiatric/Behavioral: Negative for agitation, dysphoric mood and suicidal ideas. The patient is nervous/anxious.      Objective:     Vital Signs (Most Recent):  Temp: 98.1 °F (36.7 °C) (05/09/18 0633)  Pulse: 70 (05/09/18 0633)  Resp: 16 (05/09/18 0633)  BP: 129/64 (05/09/18 0633)  SpO2: 97 % (05/09/18 0633) Vital Signs (24h Range):  Temp:  [98.1 °F (36.7 °C)-98.9 °F (37.2 °C)] 98.1 °F (36.7 °C)  Pulse:  [68-78] 70  Resp:  [15-18] 16  SpO2:  [96 %-98 %] 97 %  BP: (126-129)/(64-76) 129/64     Weight: 64.9 kg (143 lb)  Body mass index is 25.33 kg/m².    Physical Exam   Constitutional: She is oriented to person, place, and time. She appears well-developed and well-nourished. No distress.   HENT:   Head: Normocephalic and atraumatic.   Mouth/Throat: Oropharynx is clear and moist. No oropharyngeal exudate.   Eyes: Conjunctivae are normal. Pupils are equal, round, and reactive to light. No scleral icterus.   Neck: Neck supple.   Cardiovascular: Normal rate, regular rhythm, normal heart sounds and intact distal pulses.  Exam reveals no gallop and no friction rub.    No murmur heard.  Pulmonary/Chest: Effort normal and breath sounds normal. No respiratory distress. She has no wheezes. She has no rales.   Abdominal: Soft. Bowel sounds are normal. She exhibits no distension. There is no tenderness. There is no rebound and no guarding.   Musculoskeletal: She  exhibits no edema, tenderness or deformity.   Neurological: She is alert and oriented to person, place, and time. She exhibits normal muscle tone.   Skin: Skin is warm and dry. Capillary refill takes less than 2 seconds. No rash noted. She is not diaphoretic.   Psychiatric: Her behavior is normal. Her mood appears anxious.   Nursing note and vitals reviewed.        CRANIAL NERVES     CN III, IV, VI   Pupils are equal, round, and reactive to light.       Significant Labs:   CBC:   Recent Labs  Lab 05/09/18  0433   WBC 5.72   HGB 11.2*   HCT 34.0*        CMP:   Recent Labs  Lab 05/09/18 0433      K 3.9      CO2 25   *   BUN 9   CREATININE 0.8   CALCIUM 9.5   PROT 7.2   ALBUMIN 3.7   BILITOT 0.3   ALKPHOS 81   AST 11   ALT 5*   ANIONGAP 10   EGFRNONAA >60     Cardiac Markers:   Recent Labs  Lab 05/09/18  0532   BNP 14     Magnesium:   Recent Labs  Lab 05/09/18 0433   MG 1.7     Troponin:   Recent Labs  Lab 05/09/18  0433   TROPONINI <0.006     TSH: No results for input(s): TSH in the last 4320 hours.    ECG 5/9/18 428 am: Normal sinus rhythm (HR 71). Normal ECG, No STEMI  Significant Imaging: I have reviewed all pertinent imaging results/findings within the past 24 hours.   Imaging Results          X-Ray Chest PA And Lateral (Final result)  Result time 05/09/18 05:12:23    Final result by Isaiah Alegre MD (05/09/18 05:12:23)                 Impression:      1. No acute radiographic findings in the chest.  2. Slightly overexpanded lungs which can be seen with COPD.      Electronically signed by: Isaiah Alegre MD  Date:    05/09/2018  Time:    05:12             Narrative:    EXAMINATION:  XR CHEST PA AND LATERAL    CLINICAL HISTORY:  Chest Pain;    TECHNIQUE:  PA and lateral views of the chest were performed.    COMPARISON:  Radiograph 02/02/2015.    FINDINGS:  Mediastinal structures are midline.  Cardiac silhouette is normal in size.  Lungs are slightly over expanded but symmetric.  No  focal airspace opacities.  No pneumothorax or pleural effusions.  No free air beneath the diaphragm.  No acute osseous abnormalities.                                  Assessment/Plan:     * Chest pain    63 year old female smoker with HTN, HLD, and strong family history of CAD in with mid-sternal chest pain radiating to the left chest, relieved by nitroglycerin, no with epigastric pain. She had a negative stress test 6 months ago, per her report.  She takes Aspirin 81 mg daily. She was given Aspirin 325 mg by EMS. First Troponin < 0.06. Normal ECG. This is likely her GERD and/or Anxiety.  --Trend Troponin  --Give GI Cocktail  --Continue home Aspirin 81 mg daily          Hyperlipidemia    Continue home simvastatin 80 mg daily. Check Lipid Level          Anxiety    Anxious on exam. Continue home diazepam 10 mg BID PRN          Current smoker    Smoking Cessation Counseling. Nicoderm Patch          HTN (hypertension)    Stable. Continue home lisinopril 5 mg daily          Type 2 diabetes mellitus    Glucose on admit 128. Holding hoem Metformin 1000 mg daily.  Check blood glucose AC and HS and use SSI. Diabetic diet, when cleared for Diet. Check A1c.                VTE Risk Mitigation     None             Courtney Caldera NP  Department of Hospital Medicine   [facility]

## 2018-05-09 NOTE — HOSPITAL COURSE
Ms. Bruce presented to [facility] for evaluation of substernal chest pain. She has tobacco abuse, DM2, HLD and HTN. She reports a negative stress test from PCP about 6 months ago. Her troponin was negative x 2. Her chest pain improved/resolved with GI cocktail. She likely has some esophagitis and possible esophageal spasm as the cause of her chest pain. Will have her take prilosec 20 mg daily instead of her Zantac for the next month to see if that will help. Discharge home today. Encouraged her and her daughter to quit smoking.

## 2018-05-09 NOTE — PLAN OF CARE
Received patient from ED, ambulated to the restroom and in bed with assist. Bed alarm on, call light in reach.

## 2018-05-09 NOTE — ED PROVIDER NOTES
Encounter Date: 2018    SCRIBE #1 NOTE: I, Dolores Sales, am scribing for, and in the presence of,  Dr. Juan David Winchester. I have scribed the entire note.       History     Chief Complaint   Patient presents with    Chest Pain     Patient presents to the ED with reports of having chest pain that woke her from sleeping. Called ems. EMS reports giving 324 mg of ASA, 2 sublingual ntg, 1 inch of nitro paste. Pain started at a 4 and is down to 1/10.      This is a 63 y.o. female who has a past medical history of Chronic back pain; Diabetes mellitus; Hyperlipidemia; and Hypertension.    The patient presents to the Emergency Department via EMS with tight, mid sternal chest pain radiating to the neck and left chest onset just prior to arrival, waking the pt from her sleep.   Symptoms are associated with palpitations, fatigue, headache.  Pt denies sweating, nausea, dizziness, light headedness, extremity numbness or weakness, abdominal pain. Her pain does not radiate to the back.  She states when she gets chest pain, it normally resolves after taking aspirin. She took aspirin tonight with no improvement to her pain.  Pt states her chest pain is mostly resolved in the ED after 2 sublingual NTG, 1 inch NTG paste en route.  Pt is a daily smoker.   She has positive family history of MI.  Pt has a past surgical history that includes fracture heel (3/27/2013) and  section, classic (3/27/2013).       The history is provided by the patient.     Review of patient's allergies indicates:  No Known Allergies  Past Medical History:   Diagnosis Date    Chronic back pain     Diabetes mellitus     Hyperlipidemia     Hypertension      Past Surgical History:   Procedure Laterality Date     SECTION, CLASSIC  3/27/2013    fracture heel  3/27/2013     Family History   Problem Relation Age of Onset    Diabetes Mother     Hypertension Mother     Stroke Mother     Heart disease Father     Diabetes Sister     Stroke Sister      Diabetes Brother     Cancer Maternal Grandmother      Social History   Substance Use Topics    Smoking status: Current Some Day Smoker     Last attempt to quit: 10/31/2013    Smokeless tobacco: Not on file    Alcohol use No     Review of Systems   Constitutional: Negative for activity change, fatigue and fever.   HENT: Negative for congestion and sore throat.    Respiratory: Negative for cough and shortness of breath.    Cardiovascular: Negative for chest pain.   Gastrointestinal: Negative for abdominal pain, diarrhea, nausea and vomiting.   Genitourinary: Negative for difficulty urinating and dysuria.   Musculoskeletal: Negative for back pain and myalgias.   Skin: Negative for rash and wound.   Neurological: Negative for dizziness, weakness and headaches.   Psychiatric/Behavioral: Negative for decreased concentration and dysphoric mood.       Physical Exam     Initial Vitals   BP Pulse Resp Temp SpO2   -- -- -- -- --      MAP       --         Physical Exam    Nursing note and vitals reviewed.  Constitutional: She appears well-developed and well-nourished. No distress.   HENT:   Head: Normocephalic and atraumatic.   Mouth/Throat: Oropharynx is clear and moist.   Eyes: Conjunctivae are normal. Pupils are equal, round, and reactive to light.   Neck: Normal range of motion. Neck supple.   Cardiovascular: Normal rate, regular rhythm and normal heart sounds.   No murmur heard.  Pulmonary/Chest: Breath sounds normal. No respiratory distress. She has no wheezes. She has no rhonchi. She has no rales.   Abdominal: Soft. Bowel sounds are normal. She exhibits no distension. There is no tenderness.   Musculoskeletal: Normal range of motion. She exhibits no edema or tenderness.   Lymphadenopathy:     She has no cervical adenopathy.   Neurological: She is alert and oriented to person, place, and time.   Skin: Skin is warm and dry. Capillary refill takes less than 2 seconds. No rash noted. No erythema.   Psychiatric: She  has a normal mood and affect. Thought content normal.         ED Course   Procedures  Labs Reviewed   CBC W/ AUTO DIFFERENTIAL - Abnormal; Notable for the following:        Result Value    RBC 3.89 (*)     Hemoglobin 11.2 (*)     Hematocrit 34.0 (*)     MPV 8.6 (*)     All other components within normal limits   COMPREHENSIVE METABOLIC PANEL - Abnormal; Notable for the following:     Glucose 128 (*)     ALT 5 (*)     All other components within normal limits   TROPONIN I   PROTIME-INR   B-TYPE NATRIURETIC PEPTIDE   TSH   MAGNESIUM   TSH   MAGNESIUM     EKG Readings: (Independently Interpreted)   Initial Reading: No STEMI.   EKG: Normal sinus rhythm at 71 bpm, nl axis, nl intervals, no hypertrophy, no ST-T changes as read by me (Dr. Winchester).      Imaging Results          X-Ray Chest PA And Lateral (Final result)  Result time 05/09/18 05:12:23    Final result by Isaiah Alegre MD (05/09/18 05:12:23)                 Impression:      1. No acute radiographic findings in the chest.  2. Slightly overexpanded lungs which can be seen with COPD.      Electronically signed by: Isaiah Alegre MD  Date:    05/09/2018  Time:    05:12             Narrative:    EXAMINATION:  XR CHEST PA AND LATERAL    CLINICAL HISTORY:  Chest Pain;    TECHNIQUE:  PA and lateral views of the chest were performed.    COMPARISON:  Radiograph 02/02/2015.    FINDINGS:  Mediastinal structures are midline.  Cardiac silhouette is normal in size.  Lungs are slightly over expanded but symmetric.  No focal airspace opacities.  No pneumothorax or pleural effusions.  No free air beneath the diaphragm.  No acute osseous abnormalities.                                    Medical Decision Making:   Initial Assessment:   This is an emergent evaluation of a 63 y.o. female patient with presentation of chest pain.   Differential Diagnosis:   ACS, GERD, MSK, pleurisy, less likely PTX or dissection  Clinical Tests:   Lab Tests: Ordered and Reviewed  Radiological  Study: Ordered and Reviewed  Medical Tests: Ordered and Reviewed  ED Management:  After my evaluation and workup, I believe this patient has significant chest pain, based upon history, physical exam and workup with risk stratification. Patient initial troponin was negative, EKG shows normal sinus rhythm at rate of 71 bpm. I do not believe the patient has a STEMI at this time.  Aspirin 325mg PO given prior to arrival.  At this time I believe the patient should be admitted for further evaluation and management.  Patient understands and agrees with plan.    Case discussed with SAMANTHA Caldera, hospitalist, who agrees and will place the patient under their service.                        Clinical Impression:     1. Chest pain    2. Esophagitis         Disposition:   Disposition: Admitted         Scribe attestation:  I, Dr. Juan David Winchester, personally performed the services described in this documentation.   All medical record entries made by the scribe were at my direction and in my presence.   I have reviewed the chart and agree that the record is accurate and complete.   Juan David Winchester MD.             Juan David Winchester MD  05/12/18 [id number]

## 2018-05-09 NOTE — DISCHARGE SUMMARY
[facility] Hospital Medicine  Discharge Summary      Patient Name: Lynn Bruce  MRN: [id number]  Admission Date: 2018  Hospital Length of Stay: 0 days  Discharge Date and Time: 2018 12:28 PM  Attending Physician: Heber Baker MD   Discharging Provider: Heber Baker MD  Primary Care Provider: Ekaterina Murray NP      HPI:   Lynn Bruce is a 63 year old female with a PMHx of Type 2 Diabetes, Hypertension, Hyperlipidemia, Anxiety, GERD, and Chronic Back Pain.  She is a current smoker. Her PCP is Ekaterina Murray NP with [facility]. She presented to [facility] ED with with tight, mid sternal chest pain radiating to the neck and left chest onset just prior to arrival, waking her from sleep. She has associated with palpitations, fatigue, headache. She denies sweating, nausea, dizziness, light headedness, extremity numbness or weakness, abdominal pain. Her pain does not radiate to the back.  She took aspirin tonight with no improvement to her pain.  Her chest pain was mostly resolved in the ED after Aspirin 325 mg,  2 sublingual NTG, 1 inch NTG paste en route. She has occasional chest pain such as this, which usually resolves with an aspirin.  She states that she had a negative out-patient stress test and echocardiogram about 6 months ago at [facility]. Her Father  of a heart attack at the ago of 50.  Chest X-Ray showed no acute process, with some evidence of COPD.  Her first Troponin is negative. Her ECG is Normal Sinus Rhythm with no T wave changes. She is admitted observation status to [facility]. Upon my exam, she still endorses some mild epigastric discomfort and states she did not take her valium last night. Will give GI Cocktail and her home dose of Valium.     * No surgery found *      Hospital Course:   Ms. Bruce presented to [facility] for evaluation of substernal chest pain. She has tobacco abuse, DM2, HLD and HTN. She reports a negative stress test  from PCP about 6 months ago. Her troponin was negative x 2. Her chest pain improved/resolved with GI cocktail. She likely has some esophagitis and possible esophageal spasm as the cause of her chest pain. Will have her take prilosec 20 mg daily instead of her Zantac for the next month to see if that will help. Discharge home today. Encouraged her and her daughter to quit smoking.      Consults:     No new Assessment & Plan notes have been filed under this hospital service since the last note was generated.  Service: Hospital Medicine    Final Active Diagnoses:    Diagnosis Date Noted POA    Current smoker [F17.200] 05/09/2018 Yes    Anxiety [F41.9] 05/09/2018 Yes    Mixed hyperlipidemia [E78.2] 05/09/2018 Yes    Type 2 diabetes mellitus without complication, without long-term current use of insulin [E11.9]  Yes    Essential hypertension [I10]  Yes      Problems Resolved During this Admission:    Diagnosis Date Noted Date Resolved POA    PRINCIPAL PROBLEM:  Chest pain [R07.9] 05/09/2018 05/09/2018 Yes       Discharged Condition: good    Disposition: Home or Self Care    Follow Up:  Follow-up Information     Ekaterina Murray NP On 5/30/2018.    Specialty:  Family Medicine  Why:  at 11:00 am, Primary Care Physician Fax# [phone]  Contact information:  [address] [place] [address]  [phone]                 Patient Instructions:     Diet diabetic     Activity as tolerated     Notify your health care provider if you experience any of the following:  temperature >100.4     Notify your health care provider if you experience any of the following:  persistent nausea and vomiting or diarrhea     Notify your health care provider if you experience any of the following:  difficulty breathing or increased cough     Notify your health care provider if you experience any of the following:  persistent dizziness, light-headedness, or visual disturbances         Significant Diagnostic Studies: Labs:   CMP   Recent  Labs  Lab 05/09/18  0433      K 3.9      CO2 25   *   BUN 9   CREATININE 0.8   CALCIUM 9.5   PROT 7.2   ALBUMIN 3.7   BILITOT 0.3   ALKPHOS 81   AST 11   ALT 5*   ANIONGAP 10   ESTGFRAFRICA >60   EGFRNONAA >60   , CBC   Recent Labs  Lab 05/09/18 0433   WBC 5.72   HGB 11.2*   HCT 34.0*      , Lipid Panel   Lab Results   Component Value Date    CHOL 156 05/09/2018    HDL 57 05/09/2018    LDLCALC 81.8 05/09/2018    TRIG 86 05/09/2018    CHOLHDL 36.5 05/09/2018   , Troponin   Recent Labs  Lab 05/09/18 0914   TROPONINI <0.006    and A1C:   Recent Labs  Lab 05/09/18 0914   HGBA1C 6.2*     Radiology: X-Ray: CXR: X-Ray Chest PA and Lateral (CXR):   Results for orders placed or performed during the hospital encounter of 05/09/18   X-Ray Chest PA And Lateral    Narrative    EXAMINATION:  XR CHEST PA AND LATERAL    CLINICAL HISTORY:  Chest Pain;    TECHNIQUE:  PA and lateral views of the chest were performed.    COMPARISON:  Radiograph 02/02/2015.    FINDINGS:  Mediastinal structures are midline.  Cardiac silhouette is normal in size.  Lungs are slightly over expanded but symmetric.  No focal airspace opacities.  No pneumothorax or pleural effusions.  No free air beneath the diaphragm.  No acute osseous abnormalities.      Impression    1. No acute radiographic findings in the chest.  2. Slightly overexpanded lungs which can be seen with COPD.      Electronically signed by: Isaiah Alegre MD  Date:    05/09/2018  Time:    05:12       Pending Diagnostic Studies:     None         Medications:  Reconciled Home Medications:      Medication List      START taking these medications    omeprazole 20 MG capsule  Commonly known as:  PRILOSEC  Take 1 capsule (20 mg total) by mouth once daily.        CONTINUE taking these medications    aspirin 81 MG EC tablet  Commonly known as:  ECOTRIN  Take 81 mg by mouth once daily.     diazePAM 10 MG Tab  Commonly known as:  VALIUM  Take 10 mg by mouth 2 (two) times  daily.     FREESTYLE LITE STRIPS Strp  Generic drug:  blood sugar diagnostic     lisinopril 2.5 MG tablet  Commonly known as:  PRINIVIL,ZESTRIL  Take 5 mg by mouth once daily.     metFORMIN 500 MG tablet  Commonly known as:  GLUCOPHAGE  Take 1,000 mg by mouth daily with breakfast.     oxyCODONE-acetaminophen 7.5-325 mg per tablet  Commonly known as:  PERCOCET  Take 1 tablet by mouth every 8 (eight) hours as needed for Pain.     ranitidine 150 MG tablet  Commonly known as:  ZANTAC  Take 150 mg by mouth nightly.     simvastatin 40 MG tablet  Commonly known as:  ZOCOR  Take 80 mg by mouth every evening.            Indwelling Lines/Drains at time of discharge:   Lines/Drains/Airways          No matching active lines, drains, or airways          Time spent on the discharge of patient: 35 minutes  Patient was seen and examined on the date of discharge and determined to be suitable for discharge.         Heber Baker MD  Department of Hospital Medicine  [facility]

## 2018-05-09 NOTE — ASSESSMENT & PLAN NOTE
63 year old female smoker with HTN, HLD, and strong family history of CAD in with mid-sternal chest pain radiating to the left chest, relieved by nitroglycerin, no with epigastric pain. She had a negative stress test 6 months ago, per her report.  She takes Aspirin 81 mg daily. She was given Aspirin 325 mg by EMS. First Troponin < 0.06. Normal ECG. This is likely her GERD and/or Anxiety.  --Trend Troponin  --Give GI Cocktail  --Continue home Aspirin 81 mg daily

## 2018-10-19 ENCOUNTER — TELEPHONE (OUTPATIENT)
Dept: PHYSICAL MEDICINE AND REHAB | Facility: CLINIC | Age: 63
End: 2018-10-19

## 2018-12-18 ENCOUNTER — OFFICE VISIT (OUTPATIENT)
Dept: NEUROSURGERY | Facility: CLINIC | Age: 63
End: 2018-12-18
Payer: MEDICARE

## 2018-12-18 VITALS
WEIGHT: 149 LBS | HEART RATE: 68 BPM | DIASTOLIC BLOOD PRESSURE: 68 MMHG | SYSTOLIC BLOOD PRESSURE: 122 MMHG | BODY MASS INDEX: 26.39 KG/M2 | TEMPERATURE: 99 F

## 2018-12-18 DIAGNOSIS — M50.10 CERVICAL DISC DISORDER WITH RADICULOPATHY: Primary | ICD-10-CM

## 2018-12-18 PROCEDURE — 3078F DIAST BP <80 MM HG: CPT | Mod: CPTII,S$GLB,, | Performed by: NEUROLOGICAL SURGERY

## 2018-12-18 PROCEDURE — 3074F SYST BP LT 130 MM HG: CPT | Mod: CPTII,S$GLB,, | Performed by: NEUROLOGICAL SURGERY

## 2018-12-18 PROCEDURE — 99999 PR PBB SHADOW E&M-EST. PATIENT-LVL III: CPT | Mod: PBBFAC,,, | Performed by: NEUROLOGICAL SURGERY

## 2018-12-18 PROCEDURE — 1101F PT FALLS ASSESS-DOCD LE1/YR: CPT | Mod: CPTII,S$GLB,, | Performed by: NEUROLOGICAL SURGERY

## 2018-12-18 PROCEDURE — 3008F BODY MASS INDEX DOCD: CPT | Mod: CPTII,S$GLB,, | Performed by: NEUROLOGICAL SURGERY

## 2018-12-18 PROCEDURE — 99204 OFFICE O/P NEW MOD 45 MIN: CPT | Mod: S$GLB,,, | Performed by: NEUROLOGICAL SURGERY

## 2018-12-18 NOTE — PROGRESS NOTES
Subjective:    I, Wandy Galvez, attest that this documentation has been prepared under the direction and in the presence of JENNIFER Vaz MD.     Patient ID: Lynn Bruce is a 63 y.o. female.    Chief Complaint: No chief complaint on file.    HPI   Pt is a 63 y.o. female who presents per referral by Atul Patel for evaluation of spine. Pt with history of diabetes. Today, pt states that she endures neck pain radiating to bilateral upper extremities for the past few months, left more severe than right. Pt is left handed. Pt denies injections or PT. Pt denies pain inhibiting her daily living.     Review of Systems   Constitutional: Negative for chills, fatigue and fever.   HENT: Negative for sinus pressure and trouble swallowing.    Eyes: Negative.  Negative for visual disturbance.   Respiratory: Negative.    Cardiovascular: Negative.    Gastrointestinal: Negative.  Negative for nausea and vomiting.   Endocrine: Negative.    Genitourinary: Negative.    Musculoskeletal: Positive for neck pain (radiating to bilateral upper extremities).   Neurological: Negative for dizziness, seizures, syncope, speech difficulty, weakness and numbness.       Objective:      Physical Exam:  Nursing note and vitals reviewed.    Constitutional: She appears well-developed.     Eyes: Pupils are equal, round, and reactive to light. Conjunctivae and EOM are normal.     Cardiovascular: Normal rate, regular rhythm, normal pulses and intact distal pulses.     Abdominal: Soft.     Psych/Behavior: She is alert. She is oriented to person, place, and time. She has a normal mood and affect.     Musculoskeletal: Gait is normal.        Neck: Range of motion is full. There is no tenderness. Muscle strength is 5/5. Tone is normal.        Back: Range of motion is full. There is no tenderness. Muscle strength is 5/5. Tone is normal.        Right Upper Extremities: Range of motion is full. There is no tenderness. Muscle strength is 5/5. Tone is normal.         Left Upper Extremities: Range of motion is full. There is no tenderness. Muscle strength is 5/5. Tone is normal.       Right Lower Extremities: Range of motion is full. There is no tenderness. Muscle strength is 5/5. Tone is normal.        Left Lower Extremities: Range of motion is full. There is no tenderness. Muscle strength is 5/5. Tone is normal.     Neurological:        Coordination: She has a normal Romberg Test, normal finger to nose coordination, normal heel to shin coordination and normal tandem walking coordination.        DTRs: DTRs are normal. Tricep reflexes are 2+ on the right side and 2+ on the left side. Bicep reflexes are 2+ on the right side and 2+ on the left side. Brachioradialis reflexes are 2+ on the right side and 2+ on the left side. Patellar reflexes are 2+ on the right side and 2+ on the left side. Achilles reflexes are 2+ on the right side and 2+ on the left side.        Cranial nerves: Cranial nerve(s) II, III, IV, V, VI, VII, VIII, IX, X, XI and XII are intact.       Pt has neck pain and some restricted range of motion.   Some evidence of cervical radiculopathy but in a nonspecific dermatome.   Full strength otherwise.   No 's.   No clonus.   Normal sensation    Imaging:       MRI C-spine, done at out side facility in June, shows loss of cervical lordosis. Multilevel disc bulge worst at C5/6 slight asymmetry to the left side but no significant cord compression. Only mild foraminal narrowing.     CT Head, dated 1/20/2017, shows    I, JENNIFER Vaz MD, personally reviewed the imaging and interpreted independent of the radiology report.    Assessment/Plan:   Pt with multilevel disc bulges in the cervical spine most significant at C5/6. I don't see anything surgical at this stage and she has not exhausted conservative management. I would like to try epidural steroid injections at C5/6, antiinflammatories, and muscle relaxants. If she does not improve, we will consider surgery down the road.  She will be seen back in 3-4 months by one of our PA-C's.     I, JENNIFER Vaz MD, personally performed the services described in this documentation. All medical record entries made by the scribe, Wandy Galvez, were at my direction and in my presence.  I have reviewed the chart and agree that the record reflects my personal performance and is accurate and complete.

## 2018-12-26 RX ORDER — NAPROXEN 500 MG/1
500 TABLET ORAL 2 TIMES DAILY WITH MEALS
Qty: 90 TABLET | Refills: 2 | Status: SHIPPED | OUTPATIENT
Start: 2018-12-26 | End: 2019-01-25

## 2019-01-02 ENCOUNTER — TELEPHONE (OUTPATIENT)
Dept: NEUROSURGERY | Facility: CLINIC | Age: 64
End: 2019-01-02

## 2019-01-02 DIAGNOSIS — M50.10 CERVICAL DISC SYNDROME: Primary | ICD-10-CM

## 2019-01-10 ENCOUNTER — TELEPHONE (OUTPATIENT)
Dept: PAIN MEDICINE | Facility: CLINIC | Age: 64
End: 2019-01-10

## 2019-01-10 DIAGNOSIS — M50.10 CERVICAL DISC SYNDROME: Primary | ICD-10-CM

## 2019-01-10 NOTE — TELEPHONE ENCOUNTER
----- Message from Anna Meyer sent at 1/8/2019 11:26 AM CST -----      ----- Message -----  From: Nino Granados MA  Sent: 1/2/2019   2:17 PM  To: [facility] Schedulers        ----- Message -----  From: Mari Alaniz RN  Sent: 1/2/2019  12:24 PM  To: [facility] Clinical Support Staff    Per Dr Vaz can we get this patient scheduled for C5-6 PARTHA please

## 2019-01-21 ENCOUNTER — TELEPHONE (OUTPATIENT)
Dept: PAIN MEDICINE | Facility: CLINIC | Age: 64
End: 2019-01-21

## 2019-01-21 NOTE — TELEPHONE ENCOUNTER
----- Message from Genesis Peters sent at 1/21/2019  3:33 PM CST -----    Name of Who is Calling:VERNELL CLANCY [[id number]]    What is the request in detail: Patient states she needs to cancel procedure , patient states she is sick and will call to reschedule Please contact to further discuss and advise     Can the clinic reply by [facility]: No    What Number to Call Back if not in [facility]: [phone]

## 2019-01-25 ENCOUNTER — TELEPHONE (OUTPATIENT)
Dept: NEUROSURGERY | Facility: CLINIC | Age: 64
End: 2019-01-25

## 2019-01-25 NOTE — TELEPHONE ENCOUNTER
----- Message from Irwin Reynoso sent at 1/25/2019  9:45 AM CST -----  Contact: patient  Please call pt at [phone] if any questions    Patient is still waiting for an inflammatory medication to be called into [facility] at [phone]    Patient is having neck pain    Thank you

## 2019-01-25 NOTE — TELEPHONE ENCOUNTER
rx was sent to [email or web address]. Patient states she didn't receive.  Called in to [facility]

## 2019-02-14 ENCOUNTER — TELEPHONE (OUTPATIENT)
Dept: NEUROSURGERY | Facility: CLINIC | Age: 64
End: 2019-02-14

## 2020-04-03 ENCOUNTER — HOSPITAL ENCOUNTER (EMERGENCY)
Facility: HOSPITAL | Age: 65
Discharge: HOME | End: 2020-04-04
Attending: EMERGENCY MEDICINE
Payer: MEDICARE

## 2020-04-03 DIAGNOSIS — R07.9 ACUTE CHEST PAIN: ICD-10-CM

## 2020-04-03 DIAGNOSIS — L29.9 PRURITUS: Primary | ICD-10-CM

## 2020-04-03 PROCEDURE — 99283 EMERGENCY DEPT VISIT LOW MDM: CPT | Mod: 25

## 2020-04-04 VITALS
SYSTOLIC BLOOD PRESSURE: 147 MMHG | TEMPERATURE: 98 F | DIASTOLIC BLOOD PRESSURE: 71 MMHG | HEART RATE: 80 BPM | RESPIRATION RATE: 18 BRPM | WEIGHT: 146 LBS | HEIGHT: 63 IN | BODY MASS INDEX: 25.87 KG/M2 | OXYGEN SATURATION: 98 %

## 2020-04-04 PROCEDURE — 25000003 PHARM REV CODE 250: Performed by: EMERGENCY MEDICINE

## 2020-04-04 RX ORDER — DIPHENHYDRAMINE HCL 25 MG
25 CAPSULE ORAL
Status: COMPLETED | OUTPATIENT
Start: 2020-04-04 | End: 2020-04-04

## 2020-04-04 RX ORDER — OMEPRAZOLE 20 MG/1
20 CAPSULE, DELAYED RELEASE ORAL DAILY
Qty: 30 CAPSULE | Refills: 0 | Status: SHIPPED | OUTPATIENT
Start: 2020-04-04 | End: 2023-03-02

## 2020-04-04 RX ADMIN — DIPHENHYDRAMINE HYDROCHLORIDE 25 MG: 25 CAPSULE ORAL at 12:04

## 2020-04-04 NOTE — ED PROVIDER NOTES
Encounter Date: 4/3/2020    SCRIBE #1 NOTE: I, Toyin Merchant, am scribing for, and in the presence of,  Dr. Hancock. I have scribed the following portions of the note - Other sections scribed: HPI and ROS.       History     Chief Complaint   Patient presents with    Chest Pain     Patient presents to ED secondary to chest pain. Midsternal and described as a deep ache and burning. States took 1 aspirin and 1 sL nitro. Upon arrival, patient is hypotensive but alert and oriented. Patient states pain at its worse was 10/10 and is now 4/10.     The patient is a 65-year-old female who has a history of chronic back pain, diabetes mellitus, hypertension, and hyperlipidemia.  She presents with chest pain that began approximately 30 minutes prior to arrival. She also complains of generalized itching for the past day. She denies rash. She cannot identify any allergens that may have caused the itching. The onset of this pain awakened her from sleep.  The pain is located in the middle of her chest.  She describes it as severe.  She is unable to further characterize this pain.  She had associated mild shortness of breath.  The pain resolved shortly before ED arrival.  She denies diaphoresis, cough, palpitations, abdominal pain, nausea, vomiting.  She denies syncope.  EMS administered aspirin and a single dose of sublingual nitroglycerin during transport.  Shortly after receiving the nitroglycerin, she began to have burning midsternal chest discomfort.  She also became lightheaded and dizzy.  The patient also reports a history of acid reflux.  She had been taken an unknown medication prescribed by her PCP until 4-5 days ago when she stopped taking.  She was not instructed to discontinue this medication.  She does not know why she stopped taking it.  She did not run out of the medication.  She smokes cigarettes and states that a pack will usually last her several days.  She has first-degree relatives with history of  cardiovascular disease.    The history is provided by the patient. No  was used.     Review of patient's allergies indicates:  No Known Allergies  Past Medical History:   Diagnosis Date    Chronic back pain     Diabetes mellitus     Hyperlipidemia     Hypertension      Past Surgical History:   Procedure Laterality Date     SECTION, CLASSIC  3/27/2013    fracture heel  3/27/2013     Family History   Problem Relation Age of Onset    Diabetes Mother     Hypertension Mother     Stroke Mother     Heart disease Father     Diabetes Sister     Stroke Sister     Diabetes Brother     Cancer Maternal Grandmother      Social History     Tobacco Use    Smoking status: Current Some Day Smoker     Last attempt to quit: 10/31/2013     Years since quittin.4   Substance Use Topics    Alcohol use: No    Drug use: No     Review of Systems   Constitutional: Negative for chills and fever.   HENT: Negative for sore throat and trouble swallowing.    Eyes: Negative for visual disturbance.   Respiratory: Positive for shortness of breath. Negative for cough.    Cardiovascular: Positive for chest pain. Negative for palpitations and leg swelling.   Gastrointestinal: Positive for nausea. Negative for vomiting.   Endocrine: Negative for polydipsia and polyuria.   Genitourinary: Negative for dysuria.   Musculoskeletal: Negative for arthralgias, myalgias and neck pain.   Allergic/Immunologic: Negative for immunocompromised state.   Neurological: Negative for dizziness, syncope, weakness, light-headedness, numbness and headaches.   Hematological: Does not bruise/bleed easily.       Physical Exam     Initial Vitals [20 2318]   BP Pulse Resp Temp SpO2   (!) 71/42 62 18 98 °F (36.7 °C) 99 %      MAP       --         Physical Exam    Nursing note and vitals reviewed.  Constitutional: She appears well-developed and well-nourished. She is not diaphoretic. No distress.   HENT:   Head: Normocephalic and  atraumatic.   Mouth/Throat: Oropharynx is clear and moist.   Eyes: Conjunctivae are normal. No scleral icterus.   Neck: No JVD present.   Cardiovascular: Normal rate, regular rhythm and normal heart sounds. Exam reveals no gallop and no friction rub.    No murmur heard.  Pulses:       Radial pulses are 2+ on the right side, and 2+ on the left side.        Dorsalis pedis pulses are 2+ on the right side, and 2+ on the left side.   Pulmonary/Chest: Effort normal and breath sounds normal. No stridor. No respiratory distress. She has no decreased breath sounds. She has no wheezes. She has no rhonchi. She has no rales. She exhibits no tenderness.   Abdominal: Soft. She exhibits no distension. There is no tenderness.   Musculoskeletal:   No calf swelling or tenderness bilaterally. Negative bilateral Sandy's sign.   Neurological: She is alert and oriented to person, place, and time. GCS score is 15. GCS eye subscore is 4. GCS verbal subscore is 5. GCS motor subscore is 6.   Skin: Skin is warm and dry. No rash noted. No pallor.         ED Course   Procedures  Labs Reviewed - No data to display  EKG Readings: (Independently Interpreted)   04/04/2020 23:18  Sinus rhythm with occasional PVCs.  Ventricular rate 70 beats per minute.  Normal axis.  Normal QRS and QT intervals.  No ST segment elevation or depression.  Normal T-wave morphology.         Imaging Results          X-Ray Chest AP Portable (Final result)  Result time 04/04/20 00:34:53    Final result by Sabina Meyer MD (04/04/20 00:34:53)                 Impression:      No acute intrathoracic abnormality detected.      Electronically signed by: Sabina Meyer  Date:    04/04/2020  Time:    00:34             Narrative:    EXAMINATION:  AP PORTABLE CHEST    CLINICAL HISTORY:  Chest pain, unspecified    TECHNIQUE:  AP portable chest radiograph was submitted.    COMPARISON:  05/09/2018    FINDINGS:  AP portable chest radiograph demonstrates a cardiac silhouette  within normal limits.  There is no focal consolidation, pneumothorax, or pleural effusion.                                 Medical Decision Making:   Independently Interpreted Test(s):   I have ordered and independently interpreted EKG Reading(s) - see prior notes  Clinical Tests:   Radiological Study: Ordered and Reviewed  Medical Tests: Ordered and Reviewed    Medical decision making:  This patient underwent evaluation following an episode of chest pain.  This had resolved prior to my evaluation.  Her primary complaint when I question her was of generalized itching that had been ongoing for a day.  She reported no rash and had no rash on skin survey.  One of the EMTs administered sublingual nitroglycerin and aspirin during transport.  The patient reports that she became lightheaded and dizzy and had burning chest discomfort shortly after receiving this medication.  She was hypotensive on arrival.  This was felt to be due to these sublingual nitroglycerin.  EKG was negative for arrhythmia and acute ischemic changes.  Chest x-ray was negative for acute processes.  The patient's chest discomfort was felt to be due to GERD and not ACS, aortic dissection, PE, or other more serious etiologies.  She underwent a period of ED monitoring had no recurrence of chest discomfort.  The cause of her generalized itching was unclear.  She had improvement with Benadryl and was discharged.  She was given strict ED return instructions.  She was advised to arrange close follow-up with her PCP.              Attending Attestation:             Attending ED Notes:   Portions of this chart were completed by the scribe by interpretive transcription of statements made by the patient as a result of my questions at the bedside. Other portions were completed by the scribe from statements made by me for direct transcription into the medical record. Following completion of the charting by the scribe, I made modifications for both correctness and  proper phrasing.  - Teo Hancock III, M.D.                          Clinical Impression:     1. Pruritus    2. Acute chest pain         Disposition:   Disposition: Discharged  Condition: Stable                        Teo Hancock III, MD  04/07/20 0141

## 2020-04-04 NOTE — ED TRIAGE NOTES
"Patient presents to ED via  EMS from home with reports of having chest pain that started "earlier today". Patient states having initially had an aching burning to the chest that was treated "with tums". Patient states pain improved, but then reports having woken up with chest pain again tonight. Symptoms include hiccups and chest aching. Treated with a dose of "adult" aspirin.  EMS treated patient with x 1 tablet of sublingual nitroglycerin and patient arrived to the ED with a hypotensive pressure.     Review of patient's allergies indicates:  No Known Allergies     Patient has verified the spelling of their name and  on armband.   APPEARANCE: Patient is alert, calm, oriented x 4, and does not appear distressed.  SKIN: Skin is normal for race, warm, and dry. Normal skin turgor and mucous membranes moist.  CARDIAC: Normal rate and rhythm, no murmur heard. +Chest pain.   RESPIRATORY:Normal rate and effort. Breath sounds clear bilaterally throughout chest. Respirations are equal and unlabored.    GASTRO: Bowel sounds normal, abdomen is soft, no tenderness, and no abdominal distention. +Hiccuping. +burning.   MUSCLE: Full ROM. No bony tenderness or soft tissue tenderness. No obvious deformity.  PERIPHERAL VASCULAR: peripheral pulses present. Normal cap refill. No edema. Warm to touch.  MENTAL STATUS: awake, alert and aware of environment.  ENT: EARS: no obvious drainage. NOSE: no active bleeding. THROAT: no redness or swelling.  "

## 2023-03-02 ENCOUNTER — HOSPITAL ENCOUNTER (OUTPATIENT)
Facility: HOSPITAL | Age: 68
Discharge: HOME | End: 2023-03-02
Attending: EMERGENCY MEDICINE | Admitting: HOSPITALIST
Payer: MEDICARE

## 2023-03-02 VITALS
HEART RATE: 86 BPM | TEMPERATURE: 98 F | RESPIRATION RATE: 16 BRPM | HEIGHT: 63 IN | OXYGEN SATURATION: 98 % | WEIGHT: 138 LBS | BODY MASS INDEX: 24.45 KG/M2 | SYSTOLIC BLOOD PRESSURE: 158 MMHG | DIASTOLIC BLOOD PRESSURE: 72 MMHG

## 2023-03-02 DIAGNOSIS — R22.0 FACIAL SWELLING: Primary | ICD-10-CM

## 2023-03-02 DIAGNOSIS — F17.200 CURRENT SMOKER: ICD-10-CM

## 2023-03-02 DIAGNOSIS — R07.9 CHEST PAIN: ICD-10-CM

## 2023-03-02 LAB
ALBUMIN SERPL BCP-MCNC: 3.6 G/DL (ref 3.5–5.2)
ALP SERPL-CCNC: 74 U/L (ref 55–135)
ALT SERPL W/O P-5'-P-CCNC: 8 U/L (ref 10–44)
ANION GAP SERPL CALC-SCNC: 11 MMOL/L (ref 8–16)
AST SERPL-CCNC: 10 U/L (ref 10–40)
BASOPHILS # BLD AUTO: 0.05 K/UL (ref 0–0.2)
BASOPHILS NFR BLD: 0.4 % (ref 0–1.9)
BILIRUB SERPL-MCNC: 0.2 MG/DL (ref 0.1–1)
BUN SERPL-MCNC: 9 MG/DL (ref 6–30)
BUN SERPL-MCNC: 9 MG/DL (ref 8–23)
CALCIUM SERPL-MCNC: 9.1 MG/DL (ref 8.7–10.5)
CHLORIDE SERPL-SCNC: 108 MMOL/L (ref 95–110)
CHLORIDE SERPL-SCNC: 108 MMOL/L (ref 95–110)
CO2 SERPL-SCNC: 22 MMOL/L (ref 23–29)
CREAT SERPL-MCNC: 0.7 MG/DL (ref 0.5–1.4)
CREAT SERPL-MCNC: 0.8 MG/DL (ref 0.5–1.4)
DIFFERENTIAL METHOD: ABNORMAL
EOSINOPHIL # BLD AUTO: 0 K/UL (ref 0–0.5)
EOSINOPHIL NFR BLD: 0.3 % (ref 0–8)
ERYTHROCYTE [DISTWIDTH] IN BLOOD BY AUTOMATED COUNT: 16 % (ref 11.5–14.5)
EST. GFR  (NO RACE VARIABLE): >60 ML/MIN/1.73 M^2
GLUCOSE SERPL-MCNC: 144 MG/DL (ref 70–110)
GLUCOSE SERPL-MCNC: 147 MG/DL (ref 70–110)
HCT VFR BLD AUTO: 35.7 % (ref 37–48.5)
HCT VFR BLD CALC: 36 %PCV (ref 36–54)
HCV AB SERPL QL IA: NORMAL
HGB BLD-MCNC: 11.6 G/DL (ref 12–16)
HIV 1+2 AB+HIV1 P24 AG SERPL QL IA: NORMAL
IMM GRANULOCYTES # BLD AUTO: 0.07 K/UL (ref 0–0.04)
IMM GRANULOCYTES NFR BLD AUTO: 0.6 % (ref 0–0.5)
LYMPHOCYTES # BLD AUTO: 1.2 K/UL (ref 1–4.8)
LYMPHOCYTES NFR BLD: 9.2 % (ref 18–48)
MCH RBC QN AUTO: 27.6 PG (ref 27–31)
MCHC RBC AUTO-ENTMCNC: 32.5 G/DL (ref 32–36)
MCV RBC AUTO: 85 FL (ref 82–98)
MONOCYTES # BLD AUTO: 0.3 K/UL (ref 0.3–1)
MONOCYTES NFR BLD: 2.6 % (ref 4–15)
NEUTROPHILS # BLD AUTO: 10.9 K/UL (ref 1.8–7.7)
NEUTROPHILS NFR BLD: 86.9 % (ref 38–73)
NRBC BLD-RTO: 0 /100 WBC
PLATELET # BLD AUTO: 271 K/UL (ref 150–450)
PMV BLD AUTO: 8.4 FL (ref 9.2–12.9)
POC IONIZED CALCIUM: 1.05 MMOL/L (ref 1.06–1.42)
POC TCO2 (MEASURED): 23 MMOL/L (ref 23–29)
POCT GLUCOSE: 195 MG/DL (ref 70–110)
POTASSIUM BLD-SCNC: 3.6 MMOL/L (ref 3.5–5.1)
POTASSIUM SERPL-SCNC: 3.6 MMOL/L (ref 3.5–5.1)
PROT SERPL-MCNC: 7.3 G/DL (ref 6–8.4)
RBC # BLD AUTO: 4.21 M/UL (ref 4–5.4)
SAMPLE: ABNORMAL
SODIUM BLD-SCNC: 142 MMOL/L (ref 136–145)
SODIUM SERPL-SCNC: 141 MMOL/L (ref 136–145)
WBC # BLD AUTO: 12.56 K/UL (ref 3.9–12.7)

## 2023-03-02 PROCEDURE — 99285 PR EMERGENCY DEPT VISIT,LEVEL V: ICD-10-PCS | Mod: ,,, | Performed by: EMERGENCY MEDICINE

## 2023-03-02 PROCEDURE — 25000003 PHARM REV CODE 250: Performed by: EMERGENCY MEDICINE

## 2023-03-02 PROCEDURE — 82962 GLUCOSE BLOOD TEST: CPT

## 2023-03-02 PROCEDURE — 25000003 PHARM REV CODE 250

## 2023-03-02 PROCEDURE — 63600175 PHARM REV CODE 636 W HCPCS: Performed by: NEUROLOGICAL SURGERY

## 2023-03-02 PROCEDURE — 99285 EMERGENCY DEPT VISIT HI MDM: CPT | Mod: 25

## 2023-03-02 PROCEDURE — 87389 HIV-1 AG W/HIV-1&-2 AB AG IA: CPT | Performed by: PHYSICIAN ASSISTANT

## 2023-03-02 PROCEDURE — 99406 BEHAV CHNG SMOKING 3-10 MIN: CPT | Mod: ,,,

## 2023-03-02 PROCEDURE — 96365 THER/PROPH/DIAG IV INF INIT: CPT | Mod: 59

## 2023-03-02 PROCEDURE — 25500020 PHARM REV CODE 255: Performed by: EMERGENCY MEDICINE

## 2023-03-02 PROCEDURE — 99223 1ST HOSP IP/OBS HIGH 75: CPT | Mod: 25,AI,,

## 2023-03-02 PROCEDURE — 96375 TX/PRO/DX INJ NEW DRUG ADDON: CPT | Mod: 59

## 2023-03-02 PROCEDURE — 80047 BASIC METABLC PNL IONIZED CA: CPT

## 2023-03-02 PROCEDURE — 99406 PR TOBACCO USE CESSATION INTERMEDIATE 3-10 MINUTES: ICD-10-PCS | Mod: ,,,

## 2023-03-02 PROCEDURE — 99285 EMERGENCY DEPT VISIT HI MDM: CPT | Mod: ,,, | Performed by: EMERGENCY MEDICINE

## 2023-03-02 PROCEDURE — 85025 COMPLETE CBC W/AUTO DIFF WBC: CPT | Performed by: EMERGENCY MEDICINE

## 2023-03-02 PROCEDURE — 99223 PR INITIAL HOSPITAL CARE,LEVL III: ICD-10-PCS | Mod: 25,AI,,

## 2023-03-02 PROCEDURE — 80053 COMPREHEN METABOLIC PANEL: CPT | Performed by: EMERGENCY MEDICINE

## 2023-03-02 PROCEDURE — 86803 HEPATITIS C AB TEST: CPT | Performed by: PHYSICIAN ASSISTANT

## 2023-03-02 PROCEDURE — 96376 TX/PRO/DX INJ SAME DRUG ADON: CPT | Mod: 59

## 2023-03-02 PROCEDURE — G0378 HOSPITAL OBSERVATION PER HR: HCPCS

## 2023-03-02 PROCEDURE — 82330 ASSAY OF CALCIUM: CPT

## 2023-03-02 PROCEDURE — 63600175 PHARM REV CODE 636 W HCPCS: Performed by: EMERGENCY MEDICINE

## 2023-03-02 RX ORDER — BISACODYL 10 MG
10 SUPPOSITORY, RECTAL RECTAL DAILY PRN
Status: DISCONTINUED | OUTPATIENT
Start: 2023-03-02 | End: 2023-03-02 | Stop reason: HOSPADM

## 2023-03-02 RX ORDER — ONDANSETRON 8 MG/1
8 TABLET, ORALLY DISINTEGRATING ORAL EVERY 8 HOURS PRN
Status: DISCONTINUED | OUTPATIENT
Start: 2023-03-02 | End: 2023-03-02 | Stop reason: HOSPADM

## 2023-03-02 RX ORDER — GABAPENTIN 300 MG/1
300 CAPSULE ORAL 3 TIMES DAILY
COMMUNITY
Start: 2022-12-28

## 2023-03-02 RX ORDER — INSULIN ASPART 100 [IU]/ML
0-5 INJECTION, SOLUTION INTRAVENOUS; SUBCUTANEOUS
Status: DISCONTINUED | OUTPATIENT
Start: 2023-03-02 | End: 2023-03-02 | Stop reason: HOSPADM

## 2023-03-02 RX ORDER — AMLODIPINE BESYLATE 5 MG/1
5 TABLET ORAL DAILY
COMMUNITY
Start: 2023-01-20

## 2023-03-02 RX ORDER — SITAGLIPTIN 50 MG/1
50 TABLET, FILM COATED ORAL DAILY
COMMUNITY
Start: 2023-02-27

## 2023-03-02 RX ORDER — POLYETHYLENE GLYCOL 3350 17 G/17G
17 POWDER, FOR SOLUTION ORAL DAILY PRN
Status: DISCONTINUED | OUTPATIENT
Start: 2023-03-02 | End: 2023-03-02 | Stop reason: HOSPADM

## 2023-03-02 RX ORDER — TALC
6 POWDER (GRAM) TOPICAL NIGHTLY PRN
Status: DISCONTINUED | OUTPATIENT
Start: 2023-03-02 | End: 2023-03-02 | Stop reason: HOSPADM

## 2023-03-02 RX ORDER — FLUTICASONE PROPIONATE 50 MCG
1 SPRAY, SUSPENSION (ML) NASAL DAILY PRN
COMMUNITY
Start: 2023-01-27

## 2023-03-02 RX ORDER — FAMOTIDINE 40 MG/1
40 TABLET, FILM COATED ORAL DAILY
COMMUNITY
Start: 2022-12-28

## 2023-03-02 RX ORDER — DEXTROSE 40 %
30 GEL (GRAM) ORAL
Status: DISCONTINUED | OUTPATIENT
Start: 2023-03-02 | End: 2023-03-02 | Stop reason: HOSPADM

## 2023-03-02 RX ORDER — FLUCONAZOLE 100 MG/1
100 TABLET ORAL DAILY
Qty: 7 TABLET | Refills: 0 | Status: SHIPPED | OUTPATIENT
Start: 2023-03-03 | End: 2023-03-10

## 2023-03-02 RX ORDER — IPRATROPIUM BROMIDE AND ALBUTEROL SULFATE 2.5; .5 MG/3ML; MG/3ML
3 SOLUTION RESPIRATORY (INHALATION) EVERY 6 HOURS PRN
Status: DISCONTINUED | OUTPATIENT
Start: 2023-03-02 | End: 2023-03-02 | Stop reason: HOSPADM

## 2023-03-02 RX ORDER — MULTIVITAMIN
1 TABLET ORAL DAILY
COMMUNITY

## 2023-03-02 RX ORDER — GLUCAGON 1 MG
1 KIT INJECTION
Status: DISCONTINUED | OUTPATIENT
Start: 2023-03-02 | End: 2023-03-02 | Stop reason: HOSPADM

## 2023-03-02 RX ORDER — FLUCONAZOLE 200 MG/1
200 TABLET ORAL ONCE
Status: COMPLETED | OUTPATIENT
Start: 2023-03-02 | End: 2023-03-02

## 2023-03-02 RX ORDER — NALOXONE HCL 0.4 MG/ML
0.02 VIAL (ML) INJECTION
Status: DISCONTINUED | OUTPATIENT
Start: 2023-03-02 | End: 2023-03-02 | Stop reason: HOSPADM

## 2023-03-02 RX ORDER — ACETAMINOPHEN 325 MG/1
650 TABLET ORAL EVERY 8 HOURS PRN
Status: DISCONTINUED | OUTPATIENT
Start: 2023-03-02 | End: 2023-03-02 | Stop reason: HOSPADM

## 2023-03-02 RX ORDER — MORPHINE SULFATE 2 MG/ML
2 INJECTION, SOLUTION INTRAMUSCULAR; INTRAVENOUS
Status: COMPLETED | OUTPATIENT
Start: 2023-03-02 | End: 2023-03-02

## 2023-03-02 RX ORDER — SODIUM CHLORIDE 0.9 % (FLUSH) 0.9 %
10 SYRINGE (ML) INJECTION
Status: DISCONTINUED | OUTPATIENT
Start: 2023-03-02 | End: 2023-03-02 | Stop reason: HOSPADM

## 2023-03-02 RX ORDER — NYSTATIN 100000 [USP'U]/ML
5 SUSPENSION ORAL ONCE
COMMUNITY
Start: 2023-03-01 | End: 2023-03-02

## 2023-03-02 RX ORDER — ALENDRONATE SODIUM 35 MG/1
35 TABLET ORAL
COMMUNITY
Start: 2023-01-26

## 2023-03-02 RX ORDER — SODIUM CHLORIDE 0.9 % (FLUSH) 0.9 %
10 SYRINGE (ML) INJECTION EVERY 12 HOURS PRN
Status: DISCONTINUED | OUTPATIENT
Start: 2023-03-02 | End: 2023-03-02 | Stop reason: HOSPADM

## 2023-03-02 RX ORDER — TIZANIDINE 4 MG/1
4 TABLET ORAL 3 TIMES DAILY
COMMUNITY
Start: 2023-02-10

## 2023-03-02 RX ORDER — PROCHLORPERAZINE EDISYLATE 5 MG/ML
5 INJECTION INTRAMUSCULAR; INTRAVENOUS EVERY 6 HOURS PRN
Status: DISCONTINUED | OUTPATIENT
Start: 2023-03-02 | End: 2023-03-02 | Stop reason: HOSPADM

## 2023-03-02 RX ORDER — DEXTROSE 40 %
15 GEL (GRAM) ORAL
Status: DISCONTINUED | OUTPATIENT
Start: 2023-03-02 | End: 2023-03-02 | Stop reason: HOSPADM

## 2023-03-02 RX ORDER — MORPHINE SULFATE 2 MG/ML
2 INJECTION, SOLUTION INTRAMUSCULAR; INTRAVENOUS ONCE
Status: COMPLETED | OUTPATIENT
Start: 2023-03-02 | End: 2023-03-02

## 2023-03-02 RX ADMIN — IOHEXOL 100 ML: 350 INJECTION, SOLUTION INTRAVENOUS at 03:03

## 2023-03-02 RX ADMIN — ACETAMINOPHEN 650 MG: 325 TABLET ORAL at 01:03

## 2023-03-02 RX ADMIN — MORPHINE SULFATE 2 MG: 2 INJECTION, SOLUTION INTRAMUSCULAR; INTRAVENOUS at 01:03

## 2023-03-02 RX ADMIN — FLUCONAZOLE 200 MG: 200 TABLET ORAL at 01:03

## 2023-03-02 RX ADMIN — MORPHINE SULFATE 2 MG: 2 INJECTION, SOLUTION INTRAMUSCULAR; INTRAVENOUS at 05:03

## 2023-03-02 RX ADMIN — PIPERACILLIN AND TAZOBACTAM 4.5 G: 4; .5 INJECTION, POWDER, LYOPHILIZED, FOR SOLUTION INTRAVENOUS; PARENTERAL at 02:03

## 2023-03-02 NOTE — ED PROVIDER NOTES
Encounter Date: 3/2/2023       History     Chief Complaint   Patient presents with    Allergic Reaction     Arrived by ems after having allergic reaction to med she's taking for dental pain. On ems arrival, pt's tongue swollen and difficulty speaking. Given 0.15 epi, 50 benadryl, and 125 solumedrol IV     68yo female with hx of chronic back pain, DM, HTN, HLD presents via EMS after experiencing tongue swelling and blisters with difficulty speaking after taking nystatin solution that she was prescribed after dental extraction earlier today. States she used nystatin solution as prescribed by her dentist, then went to sleep and woke up 2 hours later with swollen tongue and blisters to underside of her tongue. Per EMS patient had muffled speech and difficulty swallowing on their arrival and they gave 0.15 epinephrine, 50mg benadryl and 125mg solumedrol IV with much improvement in her symptoms. At this time she reports diffuse jaw soreness ongoing since dental extraction. She denies shortness of breath, trouble swallowing, chest pain, rash other complaints.         Review of patient's allergies indicates:  No Known Allergies  Past Medical History:   Diagnosis Date    Chronic back pain     Diabetes mellitus     Hyperlipidemia     Hypertension      Past Surgical History:   Procedure Laterality Date     SECTION, CLASSIC  3/27/2013    fracture heel  3/27/2013     Family History   Problem Relation Age of Onset    Diabetes Mother     Hypertension Mother     Stroke Mother     Heart disease Father     Diabetes Sister     Stroke Sister     Diabetes Brother     Cancer Maternal Grandmother      Social History     Tobacco Use    Smoking status: Some Days     Types: Cigarettes     Last attempt to quit: 10/31/2013     Years since quittin.3   Substance Use Topics    Alcohol use: No    Drug use: No     Review of Systems   Constitutional:  Negative for chills and fever.   HENT:  Positive for dental problem, trouble swallowing  and voice change. Negative for sore throat.    Eyes:  Negative for visual disturbance.   Respiratory:  Negative for cough and shortness of breath.    Cardiovascular:  Negative for chest pain.   Gastrointestinal:  Negative for abdominal pain.   Musculoskeletal:  Negative for neck pain.   Skin:  Negative for rash.   Neurological:  Negative for syncope, facial asymmetry, speech difficulty, weakness, numbness and headaches.   Psychiatric/Behavioral:  Negative for confusion.      Physical Exam     Initial Vitals [03/02/23 0113]   BP Pulse Resp Temp SpO2   (!) 164/82 90 16 98.1 °F (36.7 °C) 96 %      MAP       --         Physical Exam    Constitutional: She appears well-developed and well-nourished.   HENT:   Head: Normocephalic and atraumatic.   Eyes: EOM are normal. Pupils are equal, round, and reactive to light.   Neck:   Normal range of motion.  Cardiovascular:  Normal rate and regular rhythm.           Pulmonary/Chest: No respiratory distress. She has no wheezes. She has no rales.   Abdominal: Abdomen is soft.   Musculoskeletal:      Cervical back: Normal range of motion.     Neurological: She is alert and oriented to person, place, and time. No cranial nerve deficit or sensory deficit. GCS score is 15. GCS eye subscore is 4. GCS verbal subscore is 5. GCS motor subscore is 6.   Skin: Capillary refill takes less than 2 seconds.       ED Course   Procedures  Labs Reviewed   HIV 1 / 2 ANTIBODY   HEPATITIS C ANTIBODY   CBC W/ AUTO DIFFERENTIAL   COMPREHENSIVE METABOLIC PANEL   ISTAT CHEM8          Imaging Results    None          Medications   piperacillin-tazobactam (ZOSYN) 4.5 g in dextrose 5 % in water (D5W) 5 % 100 mL IVPB (MB+) (has no administration in time range)   morphine injection 2 mg (2 mg Intravenous Given 3/2/23 0154)     Medical Decision Making:   Initial Assessment:   68yo female presents via EMS with tongue swelling, blisters on tongue, difficulty swallowing and dysphonia after taking nystatin solution.  Went to sleep and awoke 2 hours later with symptoms. Had dental extraction earlier today, diagnosed with dental fungal infection. Currently reports jaw soreness and mild dysphonia, ongoing since extraction but improved  Differential Diagnosis:   Drug allergy, Anaphylaxis, ludwigs angina  Clinical Tests:   Lab Tests: Ordered  Radiological Study: Ordered  ED Management:  2mg morphine IV for jaw soreness given with improvement. CT neck soft tissue ordered to rule out soft tissue infection/fluid collection given submental swelling.                         Clinical Impression:   Final diagnoses:  [T78.40XA] Allergic reaction, initial encounter (Primary)               Adriana Fritz MD  Resident  03/02/23 0217       Adriana Fritz MD  Resident  03/02/23 0247

## 2023-03-02 NOTE — ASSESSMENT & PLAN NOTE
Assistance with smoking cessation was offered, including:  [x]  Medications  [x]  Counseling  []  Printed Information on Smoking Cessation  [x]  Referral to a Smoking Cessation Program  Patient was counseled regarding smoking for 3-10 minutes.  - Pulmonary micronodule in the apicoposterior segment of the left upper lobe.  For a solid nodule <6 mm, Fleischner Society 2017 guidelines recommend no routine follow up for a low risk patient, or follow-up with non-contrast chest CT at 12 months in a high risk patient.

## 2023-03-02 NOTE — H&P
[facility] - Emergency Dept  [facility] Medicine  History & Physical    Patient Name: Lynn Bruce  MRN: [id number]  Patient Class: OP- Observation  Admission Date: 3/2/2023  Attending Physician: Fanta Guevara MD   Primary Care Provider: Ekaterina Murray NP         Patient information was obtained from patient, EMS personnel, past medical records and ER records.     Subjective:     Principal Problem:Facial swelling    Chief Complaint:   Chief Complaint   Patient presents with    Allergic Reaction     Arrived by ems after having allergic reaction to med she's taking for dental pain. On ems arrival, pt's tongue swollen and difficulty speaking. Given 0.15 epi, 50 benadryl, and 125 solumedrol IV        HPI: Lynn Bruce is a 67 y.o. female with a PMHx of T2DM, HTN, anxiety, and HLD who is being admitted to Hospital Medicine for tongue and facial swelling. Patient had the L inferior incisor extracted by her dentist yesterday and was prescribed Nystatin swish and swallow for thrush. She took the first dose last night prior to going to sleep. Around midnight, she woke up with facial burning, swelling, and pain that was worse in her jaw. Endorsed associated difficulty speaking, but denied odynophagia or dysphagia. Per EMS, patient had muffled speech and difficulty swallowing on their arrival. They gave 0.15 Epinephrine, 50mg Benadryl, and 125mg Solumedrol IV with much improvement in her symptoms. At the time of my evaluation, she endorsed diffuse jaw soreness since dental extraction. Otherwise, patient reports swelling and speech difficulty has greatly improved. She denies F/C, N/V, shortness of breath, dysphagia, odynophagia, chest pain, or rash.    ED: Hypertensive to 164/82, but improved. Otherwise, AFVSS. Hgb stable. CMP without electrolyte abnormalities. CT with minimal soft tissue swelling of the left lateral lower lip in this patient with reported recent tooth extraction. No evidence of organized fluid collection or significant  Referred To Otolaryngology For Closure Text (Leave Blank If You Do Not Want): After obtaining clear surgical margins the patient was sent to otolaryngology for surgical repair.  The patient understands they will receive post-surgical care and follow-up from the referring physician's office. oropharyngeal soft tissue stranding. Given Morphine 2 mg IV x 2 and Zosyn.      Past Medical History:   Diagnosis Date    Chronic back pain     Diabetes mellitus     Hyperlipidemia     Hypertension        Past Surgical History:   Procedure Laterality Date     SECTION, CLASSIC  3/27/2013    fracture heel  3/27/2013       Review of patient's allergies indicates:  No Known Allergies    No current facility-administered medications on file prior to encounter.     Current Outpatient Medications on File Prior to Encounter   Medication Sig    alendronate (FOSAMAX) 35 MG tablet Take 35 mg by mouth every 7 days.    amLODIPine (NORVASC) 5 MG tablet Take 5 mg by mouth once daily.    ascorbic acid (VITAMIN C ORAL) Take 1 tablet by mouth once daily.    aspirin (ECOTRIN) 81 MG EC tablet Take 81 mg by mouth once daily.    ergocalciferol, vitamin D2, (VITAMIN D ORAL) Take 1 tablet by mouth once daily.    famotidine (PEPCID) 40 MG tablet Take 40 mg by mouth once daily.    fluticasone propionate (FLONASE) 50 mcg/actuation nasal spray 1 spray by Each Nostril route daily as needed for Allergies.    gabapentin (NEURONTIN) 300 MG capsule Take 300 mg by mouth 3 (three) times daily.    JANUVIA 50 mg Tab Take 50 mg by mouth once daily.    lisinopriL (PRINIVIL,ZESTRIL) 5 MG tablet Take 5 mg by mouth once daily.     metFORMIN (GLUCOPHAGE) 1000 MG tablet Take 1,000 mg by mouth 2 (two) times daily with meals.    multivitamin (ONE DAILY MULTIVITAMIN) per tablet Take 1 tablet by mouth once daily.    nystatin (MYCOSTATIN) 100,000 unit/mL suspension Take 5 mLs by mouth once.    oxyCODONE-acetaminophen (PERCOCET)  mg per tablet Take 1 tablet by mouth every 8 (eight) hours as needed for Pain.    simvastatin (ZOCOR) 40 MG tablet Take 40 mg by mouth every evening.    tiZANidine (ZANAFLEX) 4 MG tablet Take 4 mg by mouth 3 (three) times daily.    FREESTYLE LITE STRIPS Strp     [DISCONTINUED] omeprazole (PRILOSEC) 20  MG capsule Take 1 capsule (20 mg total) by mouth once daily.     Family History       Problem Relation (Age of Onset)    Cancer Maternal Grandmother    Diabetes Mother, Sister, Brother    Heart disease Father    Hypertension Mother    Stroke Mother, Sister          Tobacco Use    Smoking status: Some Days     Types: Cigarettes     Last attempt to quit: 10/31/2013     Years since quittin.3    Smokeless tobacco: Not on file   Substance and Sexual Activity    Alcohol use: No    Drug use: No    Sexual activity: Yes     Partners: Male     Review of Systems   Constitutional:  Negative for activity change, chills and fever.   HENT:  Positive for facial swelling and voice change. Negative for trouble swallowing.    Eyes:  Negative for photophobia and visual disturbance.   Respiratory:  Negative for cough, chest tightness and shortness of breath.    Cardiovascular:  Negative for chest pain, palpitations and leg swelling.   Gastrointestinal:  Negative for abdominal pain, constipation, diarrhea, nausea and vomiting.   Genitourinary:  Negative for dysuria, frequency and hematuria.   Musculoskeletal:  Negative for back pain, gait problem and neck pain.   Skin:  Negative for rash and wound.   Neurological:  Negative for dizziness, syncope, speech difficulty and light-headedness.   Psychiatric/Behavioral:  Negative for agitation and confusion. The patient is not nervous/anxious.    Objective:     Vital Signs (Most Recent):  Temp: 99 °F (37.2 °C) (23 1337)  Pulse: 95 (23 1337)  Resp: 20 (23 1337)  BP: (!) 162/76 (23 1337)  SpO2: 97 % (23 1337)   Vital Signs (24h Range):  Temp:  [97.7 °F (36.5 °C)-99 °F (37.2 °C)] 99 °F (37.2 °C)  Pulse:  [82-95] 95  Resp:  [15-20] 20  SpO2:  [93 %-99 %] 97 %  BP: (133-164)/(69-82) 162/76     Weight: 62.6 kg (138 lb)  Body mass index is 24.45 kg/m².    Physical Exam  Vitals and nursing note reviewed.   Constitutional:       General: She is not in acute  distress.     Appearance: She is well-developed.   HENT:      Head: Normocephalic and atraumatic.      Mouth/Throat:      Mouth: Oral lesions present.      Dentition: Abnormal dentition.      Tongue: Lesions present.      Pharynx: No pharyngeal swelling, oropharyngeal exudate or posterior oropharyngeal erythema.      Comments: White coating on tongue, lesions on buccal mucosa resembling thrush  +TTP to jaw, worst near extracted tooth  Eyes:      Conjunctiva/sclera: Conjunctivae normal.      Pupils: Pupils are equal, round, and reactive to light.   Cardiovascular:      Rate and Rhythm: Normal rate and regular rhythm.      Heart sounds: Normal heart sounds.   Pulmonary:      Effort: Pulmonary effort is normal. No respiratory distress.      Breath sounds: Normal breath sounds. No wheezing.   Abdominal:      General: Bowel sounds are normal. There is no distension.      Palpations: Abdomen is soft.      Tenderness: There is no abdominal tenderness.   Musculoskeletal:         General: No tenderness. Normal range of motion.      Cervical back: Normal range of motion and neck supple.   Lymphadenopathy:      Cervical: No cervical adenopathy.   Skin:     General: Skin is warm and dry.      Capillary Refill: Capillary refill takes less than 2 seconds.      Findings: No rash.   Neurological:      Mental Status: She is alert and oriented to person, place, and time.      Cranial Nerves: No cranial nerve deficit.      Sensory: No sensory deficit.      Coordination: Coordination normal.   Psychiatric:         Behavior: Behavior normal.         Thought Content: Thought content normal.         Judgment: Judgment normal.         CRANIAL NERVES     CN III, IV, VI   Pupils are equal, round, and reactive to light.     Significant Labs: All pertinent labs within the past 24 hours have been reviewed.  CBC:   Recent Labs   Lab 03/02/23  0245 03/02/23  0256   WBC 12.56  --    HGB 11.6*  --    HCT 35.7* 36     --      CMP:   Recent  Labs   Lab 03/02/23  0245      K 3.6      CO2 22*   *   BUN 9   CREATININE 0.8   CALCIUM 9.1   PROT 7.3   ALBUMIN 3.6   BILITOT 0.2   ALKPHOS 74   AST 10   ALT 8*   ANIONGAP 11     Significant Imaging: I have reviewed all pertinent imaging results/findings within the past 24 hours.    Imaging Results              CT Soft Tissue Neck With Contrast (Final result)  Result time 03/02/23 06:23:50      Final result by Levar Peters DO (03/02/23 06:23:50)                   Impression:      Minimal soft tissue swelling of the left lateral lower lip in this patient with reported recent tooth extraction.  No evidence of organized fluid collection or significant oropharyngeal soft tissue stranding.    Pulmonary micronodule in the apicoposterior segment of the left upper lobe.  For a solid nodule <6 mm, Fleischner Society 2017 guidelines recommend no routine follow up for a low risk patient, or follow-up with non-contrast chest CT at 12 months in a high risk patient. .    Electronically signed by resident: Eloisa Faulkner  Date:    03/02/2023  Time:    05:35    Electronically signed by: Levar Peters  Date:    03/02/2023  Time:    06:23               Narrative:    EXAMINATION:  CT SOFT TISSUE NECK WITH CONTRAST    CLINICAL HISTORY:  Soft tissue swelling, infection suspected, neck xray done;Recent tooth extraction, lower jaw swelling, concern for infection/cellulitis/Taiwo?;    TECHNIQUE:  Axial CT images obtained throughout the region of the neck after the administration of 100 ml omnipaque 350 intravenous contrast. Axial, sagittal and coronal reconstructions were performed.    COMPARISON:  Maxillofacial CT and CT head from 01/20/2017.    FINDINGS:  In this patient with reported recent tooth extraction there is a left lateral pre mandibular lucency, suspected site of extraction.  Otherwise, patient is partially edentulous.  Associated minimal soft tissue swelling of the left lateral lower lip.  No  evidence of subcutaneous emphysema.  No organized fluid collections to suggest abscess formation.    No abnormal soft tissue mass is identified within the neck. There is no evidence of pathologic lymph node enlargement, specifically no submental or submandibular lymphadenopathy.    The soft tissues of the nasopharynx, oropharynx, hypopharynx and larynx demonstrate no acute abnormality.    Postoperative changes of partial thyroidectomy.  Heterogeneous attenuation of the right thyroid lobe with multiple subcentimeter hypodense nodules, the largest is inferior and measures up to 9 mm (series 2, image 113).  The parotid and submandibular glands are unremarkable.    There is a 3 vessel left-sided aortic arch mild calcific atherosclerosis.  Major vessels of the neck are patent.  Calcifications of the bilateral carotid bulbs and cavernous ICAs are noted.    Limited intracranial evaluation is within normal limits.    The visualized paranasal sinuses and mastoid air cells are essentially clear.    Lung apices demonstrate confluent emphysematous changes.  There is mild dependent atelectasis.  No consolidation or pleural effusion.  Pulmonary micronodule in the apicoposterior segment of the left upper lobe (series 3, image 302).    There are osseous degenerative changes most pronounced at C5-C6 where there is degenerative endplate sclerotic changes.  No osseous destructive lesions.                                    Assessment/Plan:     * Facial swelling  Patient with acute tongue/facial swelling following using Nystatin S&S. Woke up around midnight with facial swelling, burning, and pain. Endorses difficulty speaking and swallowing, but denied difficulty breathing.  - Administered Epi, Benadryl, and Solumedrol in EMS with moderate improvement in symptoms  - Denies F/C, N/V, shortness of breath, dysphagia, odynophagia, chest pain, or rash  - CT with minimal soft tissue swelling of the left lateral lower lip in this patient with  reported recent tooth extraction.  No evidence of organized fluid collection or significant oropharyngeal soft tissue stranding.  - Given 2 mg Morphine in ED  - Only complaining of localized pain upon eval  - Tolerating a diet  - Will transition patient from Nystatin to Fluconazole    Mixed hyperlipidemia  - Continue statin    Current smoker  Assistance with smoking cessation was offered, including:  [x]  Medications  [x]  Counseling  []  Printed Information on Smoking Cessation  [x]  Referral to a Smoking Cessation Program  Patient was counseled regarding smoking for 3-10 minutes.  - Pulmonary micronodule in the apicoposterior segment of the left upper lobe.  For a solid nodule <6 mm, Fleischner Society 2017 guidelines recommend no routine follow up for a low risk patient, or follow-up with non-contrast chest CT at 12 months in a high risk patient.    Essential hypertension  - Continue Amlodipine, Lisinopril    Type 2 diabetes mellitus without complication, without long-term current use of insulin  Patient's FSGs are controlled on current medication regimen.  Last A1c reviewed-   Lab Results   Component Value Date    HGBA1C 6.2 (H) 05/09/2018     Most recent fingerstick glucose reviewed-   Recent Labs   Lab 03/02/23  1101   POCTGLUCOSE 195*     Current correctional scale  Low  Maintain anti-hyperglycemic dose as follows-   Antihyperglycemics (From admission, onward)    Start     Stop Route Frequency Ordered    03/02/23 0834  insulin aspart U-100 pen 0-5 Units         -- SubQ Before meals & nightly PRN 03/02/23 0737      - Hold Oral hypoglycemics while patient is in the hospital.      VTE Risk Mitigation (From admission, onward)         Ordered     IP VTE LOW RISK PATIENT  Once         03/02/23 0737     Place sequential compression device  Until discontinued         03/02/23 0737     Place sequential compression device  Until discontinued         03/02/23 0737                   Lon Kelley PA-C  Department of  [facility] Medicine   [facility] - Emergency Dept

## 2023-03-02 NOTE — PLAN OF CARE
PCP follow-up appointment made for pt    March 10 at 0900hrs      Ying Dean, CHELSEY, MSW, LMSW, RSW   Case Management  [facility]  Email: [email or web address]

## 2023-03-02 NOTE — ED NOTES
Pt arrives to Ed with c/o tongue and throat swelling with onset tonight. Pt reports using nystatin mouth wash and a few hours after waking up to feeling of swelling to tongue and throat. Pt given medications from EMS. Pt reports symptoms have improved since med admin. Pt placed on BP cycling, pulse ox, and cardiac monitoring

## 2023-03-02 NOTE — HPI
Lynn Bruce is a 67 y.o. female with a PMHx of T2DM, HTN, anxiety, and HLD who is being admitted to Hospital Medicine for tongue and facial swelling. Patient had the L inferior incisor extracted by her dentist yesterday and was prescribed Nystatin swish and swallow for thrush. She took the first dose last night prior to going to sleep. Around midnight, she woke up with facial burning, swelling, and pain that was worse in her jaw. Endorsed associated difficulty speaking, but denied odynophagia or dysphagia. Per EMS, patient had muffled speech and difficulty swallowing on their arrival. They gave 0.15 Epinephrine, 50mg Benadryl, and 125mg Solumedrol IV with much improvement in her symptoms. At the time of my evaluation, she endorsed diffuse jaw soreness since dental extraction. Otherwise, patient reports swelling and speech difficulty has greatly improved. She denies F/C, N/V, shortness of breath, dysphagia, odynophagia, chest pain, or rash.    ED: Hypertensive to 164/82, but improved. Otherwise, AFVSS. Hgb stable. CMP without electrolyte abnormalities. CT with minimal soft tissue swelling of the left lateral lower lip in this patient with reported recent tooth extraction. No evidence of organized fluid collection or significant oropharyngeal soft tissue stranding. Given Morphine 2 mg IV x 2 and Zosyn.

## 2023-03-02 NOTE — SUBJECTIVE & OBJECTIVE
Past Medical History:   Diagnosis Date    Chronic back pain     Diabetes mellitus     Hyperlipidemia     Hypertension        Past Surgical History:   Procedure Laterality Date     SECTION, CLASSIC  3/27/2013    fracture heel  3/27/2013       Review of patient's allergies indicates:  No Known Allergies    No current facility-administered medications on file prior to encounter.     Current Outpatient Medications on File Prior to Encounter   Medication Sig    alendronate (FOSAMAX) 35 MG tablet Take 35 mg by mouth every 7 days.    amLODIPine (NORVASC) 5 MG tablet Take 5 mg by mouth once daily.    ascorbic acid (VITAMIN C ORAL) Take 1 tablet by mouth once daily.    aspirin (ECOTRIN) 81 MG EC tablet Take 81 mg by mouth once daily.    ergocalciferol, vitamin D2, (VITAMIN D ORAL) Take 1 tablet by mouth once daily.    famotidine (PEPCID) 40 MG tablet Take 40 mg by mouth once daily.    fluticasone propionate (FLONASE) 50 mcg/actuation nasal spray 1 spray by Each Nostril route daily as needed for Allergies.    gabapentin (NEURONTIN) 300 MG capsule Take 300 mg by mouth 3 (three) times daily.    JANUVIA 50 mg Tab Take 50 mg by mouth once daily.    lisinopriL (PRINIVIL,ZESTRIL) 5 MG tablet Take 5 mg by mouth once daily.     metFORMIN (GLUCOPHAGE) 1000 MG tablet Take 1,000 mg by mouth 2 (two) times daily with meals.    multivitamin (ONE DAILY MULTIVITAMIN) per tablet Take 1 tablet by mouth once daily.    nystatin (MYCOSTATIN) 100,000 unit/mL suspension Take 5 mLs by mouth once.    oxyCODONE-acetaminophen (PERCOCET)  mg per tablet Take 1 tablet by mouth every 8 (eight) hours as needed for Pain.    simvastatin (ZOCOR) 40 MG tablet Take 40 mg by mouth every evening.    tiZANidine (ZANAFLEX) 4 MG tablet Take 4 mg by mouth 3 (three) times daily.    FREESTYLE LITE STRIPS Strp     [DISCONTINUED] omeprazole (PRILOSEC) 20 MG capsule Take 1 capsule (20 mg total) by mouth once daily.     Family History       Problem Relation  (Age of Onset)    Cancer Maternal Grandmother    Diabetes Mother, Sister, Brother    Heart disease Father    Hypertension Mother    Stroke Mother, Sister          Tobacco Use    Smoking status: Some Days     Types: Cigarettes     Last attempt to quit: 10/31/2013     Years since quittin.3    Smokeless tobacco: Not on file   Substance and Sexual Activity    Alcohol use: No    Drug use: No    Sexual activity: Yes     Partners: Male     Review of Systems   Constitutional:  Negative for activity change, chills and fever.   HENT:  Positive for facial swelling and voice change. Negative for trouble swallowing.    Eyes:  Negative for photophobia and visual disturbance.   Respiratory:  Negative for cough, chest tightness and shortness of breath.    Cardiovascular:  Negative for chest pain, palpitations and leg swelling.   Gastrointestinal:  Negative for abdominal pain, constipation, diarrhea, nausea and vomiting.   Genitourinary:  Negative for dysuria, frequency and hematuria.   Musculoskeletal:  Negative for back pain, gait problem and neck pain.   Skin:  Negative for rash and wound.   Neurological:  Negative for dizziness, syncope, speech difficulty and light-headedness.   Psychiatric/Behavioral:  Negative for agitation and confusion. The patient is not nervous/anxious.    Objective:     Vital Signs (Most Recent):  Temp: 99 °F (37.2 °C) (23 1337)  Pulse: 95 (23 1337)  Resp: 20 (23 1337)  BP: (!) 162/76 (23 1337)  SpO2: 97 % (23 133)   Vital Signs (24h Range):  Temp:  [97.7 °F (36.5 °C)-99 °F (37.2 °C)] 99 °F (37.2 °C)  Pulse:  [82-95] 95  Resp:  [15-20] 20  SpO2:  [93 %-99 %] 97 %  BP: (133-164)/(69-82) 162/76     Weight: 62.6 kg (138 lb)  Body mass index is 24.45 kg/m².    Physical Exam  Vitals and nursing note reviewed.   Constitutional:       General: She is not in acute distress.     Appearance: She is well-developed.   HENT:      Head: Normocephalic and atraumatic.      Mouth/Throat:       Mouth: Oral lesions present.      Dentition: Abnormal dentition.      Tongue: Lesions present.      Pharynx: No pharyngeal swelling, oropharyngeal exudate or posterior oropharyngeal erythema.      Comments: White coating on tongue, lesions on buccal mucosa resembling thrush  +TTP to jaw, worst near extracted tooth  Eyes:      Conjunctiva/sclera: Conjunctivae normal.      Pupils: Pupils are equal, round, and reactive to light.   Cardiovascular:      Rate and Rhythm: Normal rate and regular rhythm.      Heart sounds: Normal heart sounds.   Pulmonary:      Effort: Pulmonary effort is normal. No respiratory distress.      Breath sounds: Normal breath sounds. No wheezing.   Abdominal:      General: Bowel sounds are normal. There is no distension.      Palpations: Abdomen is soft.      Tenderness: There is no abdominal tenderness.   Musculoskeletal:         General: No tenderness. Normal range of motion.      Cervical back: Normal range of motion and neck supple.   Lymphadenopathy:      Cervical: No cervical adenopathy.   Skin:     General: Skin is warm and dry.      Capillary Refill: Capillary refill takes less than 2 seconds.      Findings: No rash.   Neurological:      Mental Status: She is alert and oriented to person, place, and time.      Cranial Nerves: No cranial nerve deficit.      Sensory: No sensory deficit.      Coordination: Coordination normal.   Psychiatric:         Behavior: Behavior normal.         Thought Content: Thought content normal.         Judgment: Judgment normal.         CRANIAL NERVES     CN III, IV, VI   Pupils are equal, round, and reactive to light.     Significant Labs: All pertinent labs within the past 24 hours have been reviewed.  CBC:   Recent Labs   Lab 03/02/23 0245 03/02/23 0256   WBC 12.56  --    HGB 11.6*  --    HCT 35.7* 36     --      CMP:   Recent Labs   Lab 03/02/23 0245      K 3.6      CO2 22*   *   BUN 9   CREATININE 0.8   CALCIUM 9.1   PROT  7.3   ALBUMIN 3.6   BILITOT 0.2   ALKPHOS 74   AST 10   ALT 8*   ANIONGAP 11     Significant Imaging: I have reviewed all pertinent imaging results/findings within the past 24 hours.    Imaging Results              CT Soft Tissue Neck With Contrast (Final result)  Result time 03/02/23 06:23:50      Final result by Levar Peters DO (03/02/23 06:23:50)                   Impression:      Minimal soft tissue swelling of the left lateral lower lip in this patient with reported recent tooth extraction.  No evidence of organized fluid collection or significant oropharyngeal soft tissue stranding.    Pulmonary micronodule in the apicoposterior segment of the left upper lobe.  For a solid nodule <6 mm, Fleischner Society 2017 guidelines recommend no routine follow up for a low risk patient, or follow-up with non-contrast chest CT at 12 months in a high risk patient. .    Electronically signed by resident: Eloisa Faulkner  Date:    03/02/2023  Time:    05:35    Electronically signed by: Levar Peters  Date:    03/02/2023  Time:    06:23               Narrative:    EXAMINATION:  CT SOFT TISSUE NECK WITH CONTRAST    CLINICAL HISTORY:  Soft tissue swelling, infection suspected, neck xray done;Recent tooth extraction, lower jaw swelling, concern for infection/cellulitis/Taiwo?;    TECHNIQUE:  Axial CT images obtained throughout the region of the neck after the administration of 100 ml omnipaque 350 intravenous contrast. Axial, sagittal and coronal reconstructions were performed.    COMPARISON:  Maxillofacial CT and CT head from 01/20/2017.    FINDINGS:  In this patient with reported recent tooth extraction there is a left lateral pre mandibular lucency, suspected site of extraction.  Otherwise, patient is partially edentulous.  Associated minimal soft tissue swelling of the left lateral lower lip.  No evidence of subcutaneous emphysema.  No organized fluid collections to suggest abscess formation.    No abnormal soft tissue  mass is identified within the neck. There is no evidence of pathologic lymph node enlargement, specifically no submental or submandibular lymphadenopathy.    The soft tissues of the nasopharynx, oropharynx, hypopharynx and larynx demonstrate no acute abnormality.    Postoperative changes of partial thyroidectomy.  Heterogeneous attenuation of the right thyroid lobe with multiple subcentimeter hypodense nodules, the largest is inferior and measures up to 9 mm (series 2, image 113).  The parotid and submandibular glands are unremarkable.    There is a 3 vessel left-sided aortic arch mild calcific atherosclerosis.  Major vessels of the neck are patent.  Calcifications of the bilateral carotid bulbs and cavernous ICAs are noted.    Limited intracranial evaluation is within normal limits.    The visualized paranasal sinuses and mastoid air cells are essentially clear.    Lung apices demonstrate confluent emphysematous changes.  There is mild dependent atelectasis.  No consolidation or pleural effusion.  Pulmonary micronodule in the apicoposterior segment of the left upper lobe (series 3, image 302).    There are osseous degenerative changes most pronounced at C5-C6 where there is degenerative endplate sclerotic changes.  No osseous destructive lesions.

## 2023-03-02 NOTE — ED NOTES
Pt identifiers Lynn Bruce were checked and are correct  LOC: The patient is awake, alert, aware of environment with an appropriate affect. Oriented x4, speaking appropriately  APPEARANCE: Pt resting comfortably, in no acute distress, pt is clean and well groomed, clothing properly fastened  SKIN: Skin warm, dry and intact, normal skin turgor, moist mucus membranes  Swelling noted to left side of face and lower lip   RESPIRATORY: Airway is open and patent, respirations are spontaneous, even and unlabored, normal effort and rate  Breath sounds clear león to all lung fields on ausucultation  CARDIAC: Normal rate and rhythm, no peripheral edema noted, capillary refill < 3 seconds, bilateral radial pulses 2+  ABDOMEN: Soft, nontender, nondistended.   NEUROLOGIC: PERRL, facial expression is symmetrical, patient moving all extremities spontaneously, normal sensation in all extremities when touched with a finger.  Follows all commands appropriately  MUSCULOSKELETAL: No obvious deformities.

## 2023-03-02 NOTE — ASSESSMENT & PLAN NOTE
Patient's FSGs are controlled on current medication regimen.  Last A1c reviewed-   Lab Results   Component Value Date    HGBA1C 6.2 (H) 05/09/2018     Most recent fingerstick glucose reviewed-   Recent Labs   Lab 03/02/23  1101   POCTGLUCOSE 195*     Current correctional scale  Low  Maintain anti-hyperglycemic dose as follows-   Antihyperglycemics (From admission, onward)    Start     Stop Route Frequency Ordered    03/02/23 0834  insulin aspart U-100 pen 0-5 Units         -- SubQ Before meals & nightly PRN 03/02/23 0737      - Hold Oral hypoglycemics while patient is in the hospital.

## 2023-03-02 NOTE — AI DETERIORATION ALERT
Evaluation of Early Detection of Sepsis Risk     Patient Name: Lynn Bruce  MRN:  [id number]  Primary Care Team: [facility]  Date of Admission:  3/2/2023     Principal Problem:  Facial swelling   Date of Review: 03/02/2023    Patient reviewed for elevated sepsis risk score. Sepsis Screen Negative  Will continue to monitor for signs and symptoms of new or worsening infection and screen as needed. Please notify Rapid Response Team for any hypotension or decompensation.    Sepsis Screen Results     IP Sepsis Screen (most recent)       Sepsis Screen (IP) - 03/02/23 2781       Is the patient's history or complaint suggestive of a possible infection? No  -MS    Are there at least two of the following signs and symptoms present? Yes  -MS    Sepsis signs/symptoms - Tachycardia Tachycardia     >90  -MS    Sepsis signs/symptoms - WBC WBC < 4,000 or WBC > 12,000  -MS    Are any of the following organ dysfunction criteria present and not considered to be due to a chronic condition? No  -MS    Initiate Sepsis Protocol No  -MS              User Key  (r) = Recorded By, (t) = Taken By, (c) = Cosigned By      Initials Name    MS MINA Wong RN  [facility]

## 2023-03-02 NOTE — PLAN OF CARE
[facility] Emergency Dept  Initial Discharge Assessment       Primary Care Provider: Ekaterina Murray NP    Admission Diagnosis: Facial swelling [R22.0]    Admission Date: 3/2/2023  Expected Discharge Date:     Pt stated that she is independent with her ADL's and ambulation and does not require assistance or equipment.      Pt stated that she had home health in the past but could not recall the name of the company.  Pt stated she does not require post-acute services at this time as she has her son and his family to help her.      Discharge Barriers Identified: (P) None    Payor: [employer] MANAGED MEDICARE / Plan: [employer] MEDICARE HMO / Product Type: Capitation /     Extended Emergency Contact Information  Primary Emergency Contact: Viktor Bruce [place] [address] [address] [facility]  Home Phone: [phone]  Relation: Daughter  Secondary Emergency Contact: TeoDeacon  [employer] Phone: [phone]  Relation: Son  Preferred language: English   needed? No    Discharge Plan A: (P) Home  Discharge Plan B: (P) Home      [facility] - [facility] [address]  [address] [address]  Phone: [phone] Fax: [phone]      Initial Assessment (most recent)       Adult Discharge Assessment - 03/02/23 0839          Discharge Assessment    Assessment Type Discharge Planning Assessment (P)      Confirmed/corrected address, phone number and insurance Yes (P)      Confirmed Demographics Correct on Facesheet (P)      Source of Information patient (P)      Does patient/caregiver understand observation status Yes (P)      People in Home alone (P)      Facility Arrived From: home (P)      Do you expect to return to your current living situation? Yes (P)      Do you have help at home or someone to help you manage your care at home? No (P)      Prior to hospitilization cognitive status: Alert/Oriented;No Deficits (P)      Current cognitive status: Alert/Oriented;No  Deficits (P)      Home Accessibility stairs to enter home;stairs within home (P)      Number of Stairs, Within Home, Primary ten (P)      Number of Stairs, Main Entrance one (P)      Home Layout Bathroom on 2nd floor;Bedroom on 2nd floor (P)      Equipment Currently Used at Home none (P)      Patient currently being followed by outpatient case management? No (P)      Do you currently have service(s) that help you manage your care at home? No (P)      Do you have any problems affording any of your prescribed medications? No (P)      Is the patient taking medications as prescribed? yes (P)      Who is going to help you get home at discharge? family (P)      How do you get to doctors appointments? car, drives self;family or friend will provide (P)      Are you on dialysis? No (P)      Do you take coumadin? No (P)      Discharge Plan A Home (P)      Discharge Plan B Home (P)      DME Needed Upon Discharge  none (P)      Discharge Plan discussed with: Patient (P)      Discharge Barriers Identified None (P)                    Ying Dean CD, MSW, LMSW, RSW   Case Management  [facility]  Email: [email or web address]

## 2023-03-02 NOTE — PLAN OF CARE
03/02/23 0842   Post-Acute Status   Post-Acute Authorization Other   Other Status No Post-Acute Service Needs   Discharge Delays None known at this time   Discharge Plan   Discharge Plan A Home     Pt does not require post acute services at this time      Ying Dean CD, MSW, LMSW, RSW   Case Management  [facility]  Email: [email or web address]

## 2023-03-02 NOTE — ED NOTES
Right hand saline lock in place on assuming care of pt Saline lock removed for pt to bed discharge  No redness or swelling noted at site Pressure dsg applied

## 2023-03-02 NOTE — PHARMACY MED REC
"  Admission Medication History     The home medication history was taken by Agnes Bruce.    You may go to "Admission" then "Reconcile Home Medications" tabs to review and/or act upon these items.     The home medication list has been updated by the Pharmacy department.   Please read ALL comments highlighted in yellow.   Please address this information as you see fit.    Feel free to contact us if you have any questions or require assistance.      The medications listed below were removed from the home medication list. Please reorder if appropriate:  Patient reports no longer taking the following medication(s):  OMEPRAZOLE 20 MG    Medications listed below were obtained from: Patient  Current Outpatient Medications on File Prior to Encounter   Medication Sig    alendronate (FOSAMAX) 35 MG tablet   Take 35 mg by mouth every 7 days.    amLODIPine (NORVASC) 5 MG tablet   Take 5 mg by mouth once daily.    ascorbic acid (VITAMIN C ORAL)   Take 1 tablet by mouth once daily.    aspirin (ECOTRIN) 81 MG EC tablet   Take 81 mg by mouth once daily.    ergocalciferol, vitamin D2, (VITAMIN D ORAL)   Take 1 tablet by mouth once daily.    famotidine (PEPCID) 40 MG tablet   Take 40 mg by mouth once daily.    fluticasone propionate (FLONASE) 50 mcg/actuation nasal spray   1 spray by Each Nostril route daily as needed for Allergies.    gabapentin (NEURONTIN) 300 MG capsule   Take 300 mg by mouth 3 (three) times daily.    JANUVIA 50 mg Tab   Take 50 mg by mouth once daily.    lisinopriL (PRINIVIL,ZESTRIL) 5 MG tablet   Take 5 mg by mouth once daily.     metFORMIN (GLUCOPHAGE) 1000 MG tablet   Take 1,000 mg by mouth 2 (two) times daily with meals.    multivitamin (ONE DAILY MULTIVITAMIN) per tablet   Take 1 tablet by mouth once daily.    nystatin (MYCOSTATIN) 100,000 unit/mL suspension   Take 5 mLs by mouth once.    oxyCODONE-acetaminophen (PERCOCET)  mg per tablet   Take 1 tablet by mouth every 8 (eight) hours as needed for Pain. "    simvastatin (ZOCOR) 40 MG tablet   Take 40 mg by mouth every evening.    tiZANidine (ZANAFLEX) 4 MG tablet   Take 4 mg by mouth 3 (three) times daily.    FREESTYLE LITE STRIPS Strp          Potential issues to be addressed PRIOR TO DISCHARGE  Patient reported not taking the following medications: (NYSTATIN SUSP). These medications remain on the home medication list. Please address accordingly.   Patient requires education regarding drug therapies       Agnes Bruce  EXT [id number]                .

## 2023-03-10 NOTE — ASSESSMENT & PLAN NOTE
Patient with acute tongue/facial swelling following using Nystatin S&S. Woke up around midnight with facial swelling, burning, and pain. Endorses difficulty speaking and swallowing, but denied difficulty breathing.  - Administered Epi, Benadryl, and Solumedrol in EMS with moderate improvement in symptoms  - Denies F/C, N/V, shortness of breath, dysphagia, odynophagia, chest pain, or rash  - CT with minimal soft tissue swelling of the left lateral lower lip in this patient with reported recent tooth extraction.  No evidence of organized fluid collection or significant oropharyngeal soft tissue stranding.  - Given 2 mg Morphine in ED  - Only complaining of localized pain upon eval  - Tolerating a diet  - Will transition patient from Nystatin to Fluconazole

## 2023-03-10 NOTE — HOSPITAL COURSE
Lynn Bruce was admitted to Hospital Medicine for facial swelling. Patient continued to improved following Epinephrine, Benadryl, and Solumedrol. Tolerated a diet and denied any dysphagia, SOB, or worsening swelling. Nystatin discontinued given reaction. Patient tolerated initial dose of Fluconazole. Medically ready for discharge at this time. Provided Fluconazole at discharge. PCP follow up scheduled.

## 2023-03-10 NOTE — ASSESSMENT & PLAN NOTE
Patient's FSGs are controlled on current medication regimen.  Last A1c reviewed-   Lab Results   Component Value Date    HGBA1C 6.2 (H) 05/09/2018   Most recent fingerstick glucose reviewed  Current correctional scale  Low  Maintain anti-hyperglycemic dose  - Hold Oral hypoglycemics while patient is in the hospital.

## 2023-03-10 NOTE — DISCHARGE SUMMARY
[facility] - Emergency Dept  [facility]  Discharge Summary      Patient Name: Lynn Bruce  MRN: [id number]  MELY: [id number]  Patient Class: OP- Observation  Admission Date: 3/2/2023  Hospital Length of Stay: 0 days  Discharge Date and Time: 3/2/2023  4:33 PM  Attending Physician: No att. providers found   Discharging Provider: Lon Kelley PA-C  Primary Care Provider: Ekaterina Murray NP  Hospital Medicine Team: [facility] Lon Kelley PA-C  Primary Care Team: [facility]    HPI:   Lynn Bruce is a 67 y.o. female with a PMHx of T2DM, HTN, anxiety, and HLD who is being admitted to Hospital Medicine for tongue and facial swelling. Patient had the L inferior incisor extracted by her dentist yesterday and was prescribed Nystatin swish and swallow for thrush. She took the first dose last night prior to going to sleep. Around midnight, she woke up with facial burning, swelling, and pain that was worse in her jaw. Endorsed associated difficulty speaking, but denied odynophagia or dysphagia. Per EMS, patient had muffled speech and difficulty swallowing on their arrival. They gave 0.15 Epinephrine, 50mg Benadryl, and 125mg Solumedrol IV with much improvement in her symptoms. At the time of my evaluation, she endorsed diffuse jaw soreness since dental extraction. Otherwise, patient reports swelling and speech difficulty has greatly improved. She denies F/C, N/V, shortness of breath, dysphagia, odynophagia, chest pain, or rash.    ED: Hypertensive to 164/82, but improved. Otherwise, AFVSS. Hgb stable. CMP without electrolyte abnormalities. CT with minimal soft tissue swelling of the left lateral lower lip in this patient with reported recent tooth extraction. No evidence of organized fluid collection or significant oropharyngeal soft tissue stranding. Given Morphine 2 mg IV x 2 and Zosyn.      * No surgery found *      Hospital Course:   Lynn Bruce was admitted to Hospital Medicine for facial swelling. Patient  continued to improved following Epinephrine, Benadryl, and Solumedrol. Tolerated a diet and denied any dysphagia, SOB, or worsening swelling. Nystatin discontinued given reaction. Patient tolerated initial dose of Fluconazole. Medically ready for discharge at this time. Provided Fluconazole at discharge. PCP follow up scheduled.       Goals of Care Treatment Preferences:  Code Status: Full Code      Consults:     ENT  * Facial swelling  Patient with acute tongue/facial swelling following using Nystatin S&S. Woke up around midnight with facial swelling, burning, and pain. Endorses difficulty speaking and swallowing, but denied difficulty breathing.  - Administered Epi, Benadryl, and Solumedrol in EMS with moderate improvement in symptoms  - Denies F/C, N/V, shortness of breath, dysphagia, odynophagia, chest pain, or rash  - CT with minimal soft tissue swelling of the left lateral lower lip in this patient with reported recent tooth extraction.  No evidence of organized fluid collection or significant oropharyngeal soft tissue stranding.  - Given 2 mg Morphine in ED  - Only complaining of localized pain upon eval  - Tolerating a diet  - Will transition patient from Nystatin to Fluconazole    Cardiac/Vascular  Mixed hyperlipidemia  - Continue statin    Essential hypertension  - Continue Amlodipine, Lisinopril    Endocrine  Type 2 diabetes mellitus without complication, without long-term current use of insulin  Patient's FSGs are controlled on current medication regimen.  Last A1c reviewed-   Lab Results   Component Value Date    HGBA1C 6.2 (H) 05/09/2018   Most recent fingerstick glucose reviewed  Current correctional scale  Low  Maintain anti-hyperglycemic dose  - Hold Oral hypoglycemics while patient is in the hospital.    Other  Current smoker  Assistance with smoking cessation was offered, including:  [x]  Medications  [x]  Counseling  []  Printed Information on Smoking Cessation  [x]  Referral to a Smoking  Cessation Program  Patient was counseled regarding smoking for 3-10 minutes.  - Pulmonary micronodule in the apicoposterior segment of the left upper lobe.  For a solid nodule <6 mm, Fleischner Society 2017 guidelines recommend no routine follow up for a low risk patient, or follow-up with non-contrast chest CT at 12 months in a high risk patient.      Final Active Diagnoses:    Diagnosis Date Noted POA    PRINCIPAL PROBLEM:  Facial swelling [R22.0] 03/02/2023 Yes    Current smoker [F17.200] 05/09/2018 Yes    Mixed hyperlipidemia [E78.2] 05/09/2018 Yes    Type 2 diabetes mellitus without complication, without long-term current use of insulin [E11.9]  Yes    Essential hypertension [I10]  Yes      Problems Resolved During this Admission:       Discharged Condition: good    Disposition: Home or Self Care    Follow Up:   Follow-up Information     Ekaterina Murray NP Follow up.    Specialty: Family Medicine  Why: March 10 at 0900hrs  Contact information:  [address] [place] [id number]  [phone]                       Patient Instructions:      Ambulatory referral/consult to Smoking Cessation Program   Standing Status: Future   Referral Priority: Routine Referral Type: Consultation   Referral Reason: Specialty Services Required   Requested Specialty: CTTS   Number of Visits Requested: 1     Diet diabetic     Diet Cardiac     Notify your health care provider if you experience any of the following:  temperature >100.4     Notify your health care provider if you experience any of the following:  persistent nausea and vomiting or diarrhea     Notify your health care provider if you experience any of the following:  severe uncontrolled pain     Notify your health care provider if you experience any of the following:  redness, tenderness, or signs of infection (pain, swelling, redness, odor or green/yellow discharge around incision site)     Notify your health care provider if you experience any of the following:   difficulty breathing or increased cough     Notify your health care provider if you experience any of the following:  severe persistent headache     Notify your health care provider if you experience any of the following:  worsening rash     Notify your health care provider if you experience any of the following:  persistent dizziness, light-headedness, or visual disturbances     Notify your health care provider if you experience any of the following:  increased confusion or weakness     Activity as tolerated       Pending Diagnostic Studies:     None         Medications:  Reconciled Home Medications:      Medication List      START taking these medications    fluconazole 100 MG tablet  Commonly known as: DIFLUCAN  Take 1 tablet (100 mg total) by mouth once daily. for 7 days        CONTINUE taking these medications    alendronate 35 MG tablet  Commonly known as: FOSAMAX  Take 35 mg by mouth every 7 days.     amLODIPine 5 MG tablet  Commonly known as: NORVASC  Take 5 mg by mouth once daily.     aspirin 81 MG EC tablet  Commonly known as: ECOTRIN  Take 81 mg by mouth once daily.     famotidine 40 MG tablet  Commonly known as: PEPCID  Take 40 mg by mouth once daily.     fluticasone propionate 50 mcg/actuation nasal spray  Commonly known as: FLONASE  1 spray by Each Nostril route daily as needed for Allergies.     FREESTYLE LITE STRIPS Strp  Generic drug: blood sugar diagnostic     gabapentin 300 MG capsule  Commonly known as: NEURONTIN  Take 300 mg by mouth 3 (three) times daily.     JANUVIA 50 MG Tab  Generic drug: SITagliptin phosphate  Take 50 mg by mouth once daily.     lisinopriL 5 MG tablet  Commonly known as: PRINIVIL,ZESTRIL  Take 5 mg by mouth once daily.     metFORMIN 1000 MG tablet  Commonly known as: GLUCOPHAGE  Take 1,000 mg by mouth 2 (two) times daily with meals.     multivitamin per tablet  Commonly known as: THERAGRAN  Take 1 tablet by mouth once daily.     oxyCODONE-acetaminophen  mg per  tablet  Commonly known as: PERCOCET  Take 1 tablet by mouth every 8 (eight) hours as needed for Pain.     simvastatin 40 MG tablet  Commonly known as: ZOCOR  Take 40 mg by mouth every evening.     tiZANidine 4 MG tablet  Commonly known as: ZANAFLEX  Take 4 mg by mouth 3 (three) times daily.     VITAMIN C ORAL  Take 1 tablet by mouth once daily.     VITAMIN D ORAL  Take 1 tablet by mouth once daily.        STOP taking these medications    nystatin 100,000 unit/mL suspension  Commonly known as: MYCOSTATIN            Indwelling Lines/Drains at time of discharge:   Lines/Drains/Airways     None                 Time spent on the discharge of patient: 36 minutes         Lon Kelley PA-C  Department of Hospital Medicine  [facility] - Emergency Dept

## 2023-05-31 NOTE — LETTER
December 26, 2018      Atul Patel, FNP  [address] [facility]  [address]  Physical Therapy Services  [place] [address]           [facility] - Neurosurgery 7th Fl  [address]  [place] [address]  Phone: [phone]          Patient: Lynn Bruce   MR Number: [id number]   YOB: 1955   Date of Visit: 12/18/2018       Dear Atul Patel:    Thank you for referring Lynn Bruce to me for evaluation. Attached you will find relevant portions of my assessment and plan of care.    If you have questions, please do not hesitate to call me. I look forward to following Lynn Bruce along with you.    Sincerely,    Joel Vaz MD    Enclosure  CC:  No Recipients    If you would like to receive this communication electronically, please contact [email or web address] or [phone] to request more information on [email or web address] Link access.    For providers and/or their staff who would like to refer a patient to [facility], please contact us through our one-stop-shop provider referral line, [facility], at [phone].    If you feel you have received this communication in error or would no longer like to receive these types of communications, please e-mail [email or web address]         
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